# Patient Record
Sex: FEMALE | Race: OTHER | Employment: STUDENT | URBAN - METROPOLITAN AREA
[De-identification: names, ages, dates, MRNs, and addresses within clinical notes are randomized per-mention and may not be internally consistent; named-entity substitution may affect disease eponyms.]

---

## 2019-01-09 ENCOUNTER — HOSPITAL ENCOUNTER (EMERGENCY)
Facility: HOSPITAL | Age: 19
Discharge: HOME | End: 2019-01-09
Attending: EMERGENCY MEDICINE
Payer: MEDICAID

## 2019-01-09 VITALS
OXYGEN SATURATION: 99 % | RESPIRATION RATE: 18 BRPM | TEMPERATURE: 99 F | DIASTOLIC BLOOD PRESSURE: 70 MMHG | BODY MASS INDEX: 33.38 KG/M2 | WEIGHT: 170 LBS | HEART RATE: 72 BPM | SYSTOLIC BLOOD PRESSURE: 122 MMHG | HEIGHT: 60 IN

## 2019-01-09 DIAGNOSIS — H66.90 ACUTE OTITIS MEDIA, UNSPECIFIED OTITIS MEDIA TYPE: Primary | ICD-10-CM

## 2019-01-09 PROCEDURE — 99283 EMERGENCY DEPT VISIT LOW MDM: CPT

## 2019-01-09 RX ORDER — AMOXICILLIN 875 MG/1
875 TABLET, COATED ORAL 2 TIMES DAILY
Qty: 20 TABLET | Refills: 0 | Status: SHIPPED | OUTPATIENT
Start: 2019-01-09 | End: 2019-01-19

## 2019-01-09 NOTE — ED PROVIDER NOTES
Patient Seen in: [facility] AND [facility] Emergency Department    History   Patient presents with:  Ear Problem Pain (neurosensory)    Stated Complaint: left ear pain    HPI    62-year-old female with no significant past medical history presents to the ER for e deformity. Lymphadenopathy: No sig cervical LAD   Neurological: Awake, alert. Normal reflexes. No cranial nerve deficit. Skin: Skin is warm and dry. No rash noted. No erythema.    Psychiatric:    ED Course   Labs Reviewed - No data to display

## 2019-01-10 ENCOUNTER — HOSPITAL ENCOUNTER (EMERGENCY)
Facility: HOSPITAL | Age: 19
Discharge: HOME | End: 2019-01-10
Attending: EMERGENCY MEDICINE
Payer: MEDICAID

## 2019-01-10 VITALS
WEIGHT: 170 LBS | OXYGEN SATURATION: 99 % | HEART RATE: 94 BPM | TEMPERATURE: 98 F | DIASTOLIC BLOOD PRESSURE: 71 MMHG | BODY MASS INDEX: 33 KG/M2 | SYSTOLIC BLOOD PRESSURE: 129 MMHG | RESPIRATION RATE: 16 BRPM

## 2019-01-10 DIAGNOSIS — H60.502 ACUTE OTITIS EXTERNA OF LEFT EAR, UNSPECIFIED TYPE: Primary | ICD-10-CM

## 2019-01-10 PROCEDURE — 99283 EMERGENCY DEPT VISIT LOW MDM: CPT

## 2019-01-10 RX ORDER — ACETAMINOPHEN 500 MG
1000 TABLET ORAL ONCE
Status: COMPLETED | OUTPATIENT
Start: 2019-01-10 | End: 2019-01-10

## 2019-01-10 NOTE — ED NOTES
Pt provided with discharge instructions and prescription. Verbalized understanding for plan of care at home and follow up. All questions/concerns addressed prior to discharge.    Pt ambulatory out of er with steady gait

## 2019-01-10 NOTE — ED NOTES
Pt c/o left ear ache, states that she was seen yesterday and rxed an antibiotic. Pt also states that she has been taking motrin q 6 hr with no relief.

## 2019-01-10 NOTE — ED PROVIDER NOTES
Patient Seen in: [facility] AND [facility] Emergency Department    History   Patient presents with:  Ear Problem Pain (neurosensory)    Stated Complaint:     HPI    19-year-old female with no significant past medical history resents to the ER for evaluation of l Musculoskeletal: Normal range of motion. No deformity. Lymphadenopathy: No sig cervical LAD   Neurological: Awake, alert. Normal reflexes. No cranial nerve deficit. Skin: Skin is warm and dry. No rash noted. No erythema.    Psychiatric:    ED Course

## 2019-01-11 ENCOUNTER — HOSPITAL ENCOUNTER (EMERGENCY)
Facility: HOSPITAL | Age: 19
Discharge: HOME | End: 2019-01-11
Attending: PHYSICIAN ASSISTANT
Payer: MEDICAID

## 2019-01-11 ENCOUNTER — OFFICE VISIT (OUTPATIENT)
Dept: FAMILY MEDICINE CLINIC | Facility: CLINIC | Age: 19
End: 2019-01-11
Payer: MEDICAID

## 2019-01-11 VITALS
TEMPERATURE: 98 F | DIASTOLIC BLOOD PRESSURE: 76 MMHG | HEART RATE: 80 BPM | BODY MASS INDEX: 34.95 KG/M2 | HEIGHT: 60 IN | WEIGHT: 178 LBS | SYSTOLIC BLOOD PRESSURE: 108 MMHG | RESPIRATION RATE: 18 BRPM

## 2019-01-11 VITALS
RESPIRATION RATE: 18 BRPM | DIASTOLIC BLOOD PRESSURE: 62 MMHG | SYSTOLIC BLOOD PRESSURE: 105 MMHG | TEMPERATURE: 98 F | HEART RATE: 88 BPM | OXYGEN SATURATION: 98 %

## 2019-01-11 DIAGNOSIS — H60.502 ACUTE OTITIS EXTERNA OF LEFT EAR, UNSPECIFIED TYPE: Primary | ICD-10-CM

## 2019-01-11 DIAGNOSIS — H60.92 OTITIS EXTERNA OF LEFT EAR, UNSPECIFIED CHRONICITY, UNSPECIFIED TYPE: ICD-10-CM

## 2019-01-11 DIAGNOSIS — H65.00 ACUTE SEROUS OTITIS MEDIA, RECURRENCE NOT SPECIFIED, UNSPECIFIED LATERALITY: Primary | ICD-10-CM

## 2019-01-11 PROCEDURE — 99283 EMERGENCY DEPT VISIT LOW MDM: CPT

## 2019-01-11 PROCEDURE — 99212 OFFICE O/P EST SF 10 MIN: CPT | Performed by: FAMILY MEDICINE

## 2019-01-11 PROCEDURE — 99213 OFFICE O/P EST LOW 20 MIN: CPT | Performed by: FAMILY MEDICINE

## 2019-01-11 RX ORDER — TRAMADOL HYDROCHLORIDE 50 MG/1
50 TABLET ORAL EVERY 6 HOURS PRN
Qty: 12 TABLET | Refills: 0 | Status: SHIPPED | OUTPATIENT
Start: 2019-01-11 | End: 2019-01-17

## 2019-01-11 RX ORDER — OFLOXACIN 3 MG/ML
10 SOLUTION AURICULAR (OTIC) 2 TIMES DAILY
Qty: 1 BOTTLE | Refills: 0 | Status: SHIPPED | OUTPATIENT
Start: 2019-01-11 | End: 2020-11-02 | Stop reason: ALTCHOICE

## 2019-01-11 RX ORDER — TRAMADOL HYDROCHLORIDE 50 MG/1
50 TABLET ORAL ONCE
Status: COMPLETED | OUTPATIENT
Start: 2019-01-11 | End: 2019-01-11

## 2019-01-11 RX ORDER — ACETAMINOPHEN 500 MG
500 TABLET ORAL EVERY 6 HOURS PRN
COMMUNITY
End: 2020-11-02 | Stop reason: ALTCHOICE

## 2019-01-11 RX ORDER — AMOXICILLIN AND CLAVULANATE POTASSIUM 875; 125 MG/1; MG/1
1 TABLET, FILM COATED ORAL 2 TIMES DAILY
Qty: 20 TABLET | Refills: 0 | Status: SHIPPED | OUTPATIENT
Start: 2019-01-11 | End: 2019-01-21

## 2019-01-11 RX ORDER — AMOXICILLIN 875 MG/1
TABLET, COATED ORAL
Refills: 0 | COMMUNITY
Start: 2019-01-09 | End: 2019-01-11

## 2019-01-11 RX ORDER — OFLOXACIN 3 MG/ML
10 SOLUTION AURICULAR (OTIC) 2 TIMES DAILY
Qty: 1 BOTTLE | Refills: 0 | Status: SHIPPED | OUTPATIENT
Start: 2019-01-11 | End: 2019-01-11

## 2019-01-11 NOTE — PROGRESS NOTES
Blood pressure 108/76, pulse 80, temperature 97.9 °F (36.6 °C), temperature source Oral, resp. rate 18, height 5' (1.524 m), weight 178 lb (80.7 kg). Presents today complaining of left ear pain that began 3 days ago.   Denies nasal congestion or swimming

## 2019-01-12 ENCOUNTER — HOSPITAL ENCOUNTER (EMERGENCY)
Facility: HOSPITAL | Age: 19
Discharge: HOME | End: 2019-01-12
Attending: EMERGENCY MEDICINE
Payer: MEDICAID

## 2019-01-12 VITALS
HEART RATE: 91 BPM | OXYGEN SATURATION: 98 % | TEMPERATURE: 98 F | SYSTOLIC BLOOD PRESSURE: 109 MMHG | BODY MASS INDEX: 33.38 KG/M2 | RESPIRATION RATE: 18 BRPM | WEIGHT: 170 LBS | HEIGHT: 60 IN | DIASTOLIC BLOOD PRESSURE: 66 MMHG

## 2019-01-12 DIAGNOSIS — H60.502 ACUTE OTITIS EXTERNA OF LEFT EAR, UNSPECIFIED TYPE: Primary | ICD-10-CM

## 2019-01-12 LAB — B-HCG UR QL: NEGATIVE

## 2019-01-12 PROCEDURE — 81025 URINE PREGNANCY TEST: CPT

## 2019-01-12 PROCEDURE — 96372 THER/PROPH/DIAG INJ SC/IM: CPT

## 2019-01-12 PROCEDURE — 99283 EMERGENCY DEPT VISIT LOW MDM: CPT

## 2019-01-12 RX ORDER — IBUPROFEN 600 MG/1
600 TABLET ORAL ONCE
Status: COMPLETED | OUTPATIENT
Start: 2019-01-12 | End: 2019-01-12

## 2019-01-12 RX ORDER — MORPHINE SULFATE 4 MG/ML
4 INJECTION, SOLUTION INTRAMUSCULAR; INTRAVENOUS ONCE
Status: COMPLETED | OUTPATIENT
Start: 2019-01-12 | End: 2019-01-12

## 2019-01-12 NOTE — ED NOTES
Rec'd patient sitting on cart with family members with complaints continued left ear pain. States ear pain started on Tuesday and was seen and prescribed abx and patient states she returned the following day because she continued to have pain.   Mom states

## 2019-01-12 NOTE — ED INITIAL ASSESSMENT (HPI)
Pt dx with ear infection 1/9/19 at [address] [place] [address] ED. Pt was started on abx on the 9th. Pt returned yesterday for continued pain and dx additionally with otitis externa and started on ear drops. Pt here  Today with c/o increased pain despite motrin and tylenol.

## 2019-01-12 NOTE — ED INITIAL ASSESSMENT (HPI)
Left earrache since Tuesday, started antibiotics Wednesday, has had worsening symptoms, seen yesterday for same, given tramadol with no relief last dose at 0100.

## 2019-01-12 NOTE — ED PROVIDER NOTES
Patient Seen in: [facility] AND [facility] Emergency Department    History   Patient presents with:  Ear Problem Pain (neurosensory)    Stated Complaint: seen here yesterday for an ear infection, medicine is not working    HPI      69-year-old female presents wi No      Frequency: Never    Drug use: Not on file      Review of Systems    Positive for stated complaint: seen here yesterday for an ear infection, medicine is not working  Other systems are as noted in HPI. Constitutional and vital signs reviewed. organomegaly is noted. No peritoneal signs. Genitourinary: Not examined. Lymphatic: No gross lymphadenopathy noted. Musculoskeletal: Musculoskeletal system is grossly intact. There is no obvious deformity.   Neurological: Gross motor movement is intact

## 2019-01-13 NOTE — ED PROVIDER NOTES
Patient Seen in: [facility] AND [facility] Emergency Department    History   Patient presents with:  Ear Pain    Stated Complaint: left ear pain    HPI    23 yo female seen earlier in the day and diagnosed with left otitis externa.  Taking tramadol at home but pa PREGNANCY URINE - Normal                MDM   Wick placed in left ear. Patient given pain meds and feeling better. Discharged home.              Disposition and Plan     Clinical Impression:  Acute otitis externa of left ear, unspecified type  (primary enco

## 2019-01-14 ENCOUNTER — HOSPITAL ENCOUNTER (EMERGENCY)
Facility: HOSPITAL | Age: 19
Discharge: HOME | End: 2019-01-14
Attending: EMERGENCY MEDICINE
Payer: MEDICAID

## 2019-01-14 VITALS
DIASTOLIC BLOOD PRESSURE: 73 MMHG | WEIGHT: 170 LBS | RESPIRATION RATE: 20 BRPM | SYSTOLIC BLOOD PRESSURE: 108 MMHG | OXYGEN SATURATION: 98 % | BODY MASS INDEX: 33.38 KG/M2 | HEART RATE: 103 BPM | HEIGHT: 60 IN | TEMPERATURE: 100 F

## 2019-01-14 DIAGNOSIS — J02.0 ACUTE STREPTOCOCCAL PHARYNGITIS: ICD-10-CM

## 2019-01-14 DIAGNOSIS — H60.501 ACUTE OTITIS EXTERNA OF RIGHT EAR, UNSPECIFIED TYPE: Primary | ICD-10-CM

## 2019-01-14 LAB
ANION GAP SERPL CALC-SCNC: 11 MMOL/L (ref 0–18)
B-HCG UR QL: NEGATIVE
BASOPHILS # BLD: 0 K/UL (ref 0–0.2)
BASOPHILS NFR BLD: 0 %
BILIRUB UR QL: NEGATIVE
BUN SERPL-MCNC: 5 MG/DL (ref 8–20)
BUN/CREAT SERPL: 8.2 (ref 10–20)
CALCIUM SERPL-MCNC: 8.8 MG/DL (ref 8.5–10.5)
CHLORIDE SERPL-SCNC: 104 MMOL/L (ref 95–110)
CLARITY UR: CLEAR
CO2 SERPL-SCNC: 22 MMOL/L (ref 22–32)
COLOR UR: YELLOW
CREAT SERPL-MCNC: 0.61 MG/DL (ref 0.5–1.5)
EOSINOPHIL # BLD: 0.1 K/UL (ref 0–0.7)
EOSINOPHIL NFR BLD: 1 %
ERYTHROCYTE [DISTWIDTH] IN BLOOD BY AUTOMATED COUNT: 14.7 % (ref 11–15)
GLUCOSE SERPL-MCNC: 97 MG/DL (ref 70–99)
GLUCOSE UR-MCNC: NEGATIVE MG/DL
HCT VFR BLD AUTO: 35.5 % (ref 35–48)
HETEROPH AB SER QL: NEGATIVE
HGB BLD-MCNC: 11.4 G/DL (ref 12–16)
HGB UR QL STRIP.AUTO: NEGATIVE
KETONES UR-MCNC: NEGATIVE MG/DL
LEUKOCYTE ESTERASE UR QL STRIP.AUTO: NEGATIVE
LYMPHOCYTES # BLD: 1.9 K/UL (ref 1–4)
LYMPHOCYTES NFR BLD: 16 %
MCH RBC QN AUTO: 25.5 PG (ref 27–32)
MCHC RBC AUTO-ENTMCNC: 32.2 G/DL (ref 32–37)
MCV RBC AUTO: 79.1 FL (ref 80–100)
MONOCYTES # BLD: 0.8 K/UL (ref 0–1)
MONOCYTES NFR BLD: 7 %
NEUTROPHILS # BLD AUTO: 8.6 K/UL (ref 1.8–7.7)
NEUTROPHILS NFR BLD: 76 %
NITRITE UR QL STRIP.AUTO: NEGATIVE
OSMOLALITY UR CALC.SUM OF ELEC: 281 MOSM/KG (ref 275–295)
PH UR: 5 [PH] (ref 5–8)
PLATELET # BLD AUTO: 214 K/UL (ref 140–400)
PMV BLD AUTO: 10.5 FL (ref 7.4–10.3)
POTASSIUM SERPL-SCNC: 4.1 MMOL/L (ref 3.3–5.1)
PROT UR-MCNC: NEGATIVE MG/DL
RBC # BLD AUTO: 4.49 M/UL (ref 3.7–5.4)
RBC #/AREA URNS AUTO: 1 /HPF
S PYO AG THROAT QL: POSITIVE
SODIUM SERPL-SCNC: 137 MMOL/L (ref 136–144)
SP GR UR STRIP: 1.03 (ref 1–1.03)
UROBILINOGEN UR STRIP-ACNC: <2
VIT C UR-MCNC: NEGATIVE MG/DL
WBC # BLD AUTO: 11.4 K/UL (ref 4–11)
WBC #/AREA URNS AUTO: 2 /HPF

## 2019-01-14 PROCEDURE — 81025 URINE PREGNANCY TEST: CPT

## 2019-01-14 PROCEDURE — 99283 EMERGENCY DEPT VISIT LOW MDM: CPT

## 2019-01-14 PROCEDURE — 36415 COLL VENOUS BLD VENIPUNCTURE: CPT

## 2019-01-14 PROCEDURE — 85025 COMPLETE CBC W/AUTO DIFF WBC: CPT | Performed by: EMERGENCY MEDICINE

## 2019-01-14 PROCEDURE — 86308 HETEROPHILE ANTIBODY SCREEN: CPT | Performed by: EMERGENCY MEDICINE

## 2019-01-14 PROCEDURE — 87430 STREP A AG IA: CPT

## 2019-01-14 PROCEDURE — 81003 URINALYSIS AUTO W/O SCOPE: CPT | Performed by: EMERGENCY MEDICINE

## 2019-01-14 PROCEDURE — 80048 BASIC METABOLIC PNL TOTAL CA: CPT | Performed by: EMERGENCY MEDICINE

## 2019-01-14 RX ORDER — OFLOXACIN 3 MG/ML
SOLUTION AURICULAR (OTIC) DAILY
Qty: 1 BOTTLE | Refills: 0 | Status: SHIPPED | OUTPATIENT
Start: 2019-01-14 | End: 2019-01-21

## 2019-01-14 RX ORDER — IBUPROFEN 600 MG/1
600 TABLET ORAL ONCE
Status: COMPLETED | OUTPATIENT
Start: 2019-01-14 | End: 2019-01-14

## 2019-01-14 NOTE — ED INITIAL ASSESSMENT (HPI)
Pt c/o bilateral ear pain started 4 days ago, sts that she has been seen in this ER 4x already since her symptom started, was prescribed with oral antibiotic with a little improvement to left ear, pt sts that it started on left ear and now having the same

## 2019-01-14 NOTE — ED NOTES
I accidentally entered positive ucg result on this pt. I did not run a ucg on the patient.  shaan is aware and serafin feliz was emailed to fix the chart

## 2019-01-14 NOTE — ED NOTES
I incorrectly entered a positive UCG on this patient into the accucheck machine. I did not run a UCG on this pt.  Sanket Jay has

## 2019-01-14 NOTE — ED NOTES
Presents with bilat ear pain for 4 days, new onset to right ear radiating to right jaw, also throat pain. Pt has been seen daily in ED for last 4 days, on oral ABX with some relief to left ear pain.

## 2019-01-14 NOTE — ED PROVIDER NOTES
Patient Seen in: [facility] Emergency Department    History   Patient presents with:  Ear Problem Pain (neurosensory): Bilateral  Sore Throat    Stated Complaint: Ear Problem    HPI    59-year-old female presents for complaint of bilateral ear ach 98%   BMI 33.20 kg/m²         Physical Exam   Constitutional: She is oriented to person, place, and time. She appears well-developed and well-nourished. HENT:   Head: Normocephalic and atraumatic.    Right Ear: Tympanic membrane normal. There is swelling (*)     MCH 25.5 (*)     MPV 10.5 (*)     Neutrophil Absolute 8.6 (*)     All other components within normal limits   MONONUCLEOSIS, QUAL - Normal   EMH POCT PREGNANCY URINE - Normal   CBC WITH DIFFERENTIAL WITH PLATELET    Narrative:      The following ord [id number]  [phone]    Schedule an appointment as soon as possible for a visit in 2 days  For follow up and re-evaluation        Medications Prescribed:  Discharge Medication List as of 1/14/2019 10:18 AM    START taking these medications    !! ofloxacin 0

## 2019-01-17 ENCOUNTER — OFFICE VISIT (OUTPATIENT)
Dept: FAMILY MEDICINE CLINIC | Facility: CLINIC | Age: 19
End: 2019-01-17
Payer: MEDICAID

## 2019-01-17 VITALS
TEMPERATURE: 98 F | WEIGHT: 178 LBS | BODY MASS INDEX: 34.95 KG/M2 | SYSTOLIC BLOOD PRESSURE: 114 MMHG | HEART RATE: 76 BPM | HEIGHT: 60 IN | DIASTOLIC BLOOD PRESSURE: 74 MMHG

## 2019-01-17 DIAGNOSIS — H60.91 OTITIS EXTERNA OF RIGHT EAR, UNSPECIFIED CHRONICITY, UNSPECIFIED TYPE: Primary | ICD-10-CM

## 2019-01-17 PROCEDURE — 99212 OFFICE O/P EST SF 10 MIN: CPT | Performed by: FAMILY MEDICINE

## 2019-01-17 PROCEDURE — 99213 OFFICE O/P EST LOW 20 MIN: CPT | Performed by: FAMILY MEDICINE

## 2019-01-17 RX ORDER — NEOMYCIN SULFATE, POLYMYXIN B SULFATE AND HYDROCORTISONE 10; 3.5; 1 MG/ML; MG/ML; [USP'U]/ML
SUSPENSION/ DROPS AURICULAR (OTIC)
Refills: 0 | COMMUNITY
Start: 2019-01-10 | End: 2020-11-02 | Stop reason: ALTCHOICE

## 2019-01-17 NOTE — PROGRESS NOTES
Blood pressure 114/74, pulse 76, temperature 98.4 °F (36.9 °C), temperature source Oral, height 5' (1.524 m), weight 178 lb (80.7 kg), last menstrual period 12/23/2018. Patient presents today complaining of 6 days of right ear pain.   Her left ear had be

## 2019-01-24 ENCOUNTER — OFFICE VISIT (OUTPATIENT)
Dept: OTOLARYNGOLOGY | Facility: CLINIC | Age: 19
End: 2019-01-24
Payer: MEDICAID

## 2019-01-24 VITALS
HEIGHT: 61 IN | DIASTOLIC BLOOD PRESSURE: 60 MMHG | SYSTOLIC BLOOD PRESSURE: 103 MMHG | TEMPERATURE: 98 F | WEIGHT: 170 LBS | BODY MASS INDEX: 32.1 KG/M2

## 2019-01-24 DIAGNOSIS — H66.90 EAR INFECTION: Primary | ICD-10-CM

## 2019-01-24 PROCEDURE — 99203 OFFICE O/P NEW LOW 30 MIN: CPT | Performed by: OTOLARYNGOLOGY

## 2019-01-24 PROCEDURE — 99212 OFFICE O/P EST SF 10 MIN: CPT | Performed by: OTOLARYNGOLOGY

## 2019-01-24 NOTE — PROGRESS NOTES
Francisco Berrios is a 22year old female. Patient presents with:  Ear Problem: recurring bilateral ear infection      HISTORY OF PRESENT ILLNESS    She presents with a history of 1 bad ear infection that occurred bilaterally in the past 2 weeks.   Seen by person & situation. Appropriate mood and affect.    Neck Exam Normal Inspection - Normal. Palpation - Normal. Parotid gland - Normal. Thyroid gland - Normal.   Eyes Normal Conjunctiva - Right: Normal, Left: Normal. Pupil - Right: Normal, Left: Normal. Fundu

## 2019-05-24 ENCOUNTER — TELEPHONE (OUTPATIENT)
Dept: FAMILY MEDICINE CLINIC | Facility: CLINIC | Age: 19
End: 2019-05-24

## 2019-05-24 NOTE — TELEPHONE ENCOUNTER
Angie Suárez from [facility] called said he was trying to reach Pt due the frequent ER Visits    He stated wants to speak with a nurse to see if Pt made any follow up visits    Please advise.   He stated he is in office   8 am - 4:30 pm Mon-Friday if no answer can leave message on v/m

## 2019-05-24 NOTE — TELEPHONE ENCOUNTER
Spoke to [employer] and he just wanted to know if patient had follow up from ER and if she had any coming up appointments.

## 2019-08-15 ENCOUNTER — OFFICE VISIT (OUTPATIENT)
Dept: FAMILY MEDICINE CLINIC | Facility: CLINIC | Age: 19
End: 2019-08-15
Payer: MEDICAID

## 2019-08-15 VITALS
TEMPERATURE: 99 F | BODY MASS INDEX: 27.56 KG/M2 | SYSTOLIC BLOOD PRESSURE: 116 MMHG | HEIGHT: 61 IN | HEART RATE: 101 BPM | DIASTOLIC BLOOD PRESSURE: 79 MMHG | WEIGHT: 146 LBS

## 2019-08-15 DIAGNOSIS — L71.9 ACNE ROSACEA: Primary | ICD-10-CM

## 2019-08-15 PROCEDURE — 99213 OFFICE O/P EST LOW 20 MIN: CPT | Performed by: FAMILY MEDICINE

## 2019-08-15 RX ORDER — METRONIDAZOLE 10 MG/G
1 GEL TOPICAL 2 TIMES DAILY
Qty: 1 EACH | Refills: 2 | Status: SHIPPED | OUTPATIENT
Start: 2019-08-15 | End: 2019-08-16

## 2019-08-15 NOTE — PROGRESS NOTES
Blood pressure 116/79, pulse 101, temperature 98.7 °F (37.1 °C), temperature source Oral, height 5' 1\" (1.549 m), weight 146 lb (66.2 kg), last menstrual period 07/25/2019.     Patient presents today following up for rashes that have been occurring for the

## 2019-11-13 ENCOUNTER — HOSPITAL ENCOUNTER (OUTPATIENT)
Age: 19
Discharge: HOME | End: 2019-11-13
Attending: EMERGENCY MEDICINE
Payer: MEDICAID

## 2019-11-13 VITALS
OXYGEN SATURATION: 100 % | TEMPERATURE: 98 F | RESPIRATION RATE: 18 BRPM | WEIGHT: 131 LBS | HEIGHT: 60 IN | BODY MASS INDEX: 25.72 KG/M2 | SYSTOLIC BLOOD PRESSURE: 110 MMHG | HEART RATE: 77 BPM | DIASTOLIC BLOOD PRESSURE: 70 MMHG

## 2019-11-13 DIAGNOSIS — N30.00 ACUTE CYSTITIS WITHOUT HEMATURIA: Primary | ICD-10-CM

## 2019-11-13 PROCEDURE — 87186 SC STD MICRODIL/AGAR DIL: CPT | Performed by: EMERGENCY MEDICINE

## 2019-11-13 PROCEDURE — 81025 URINE PREGNANCY TEST: CPT

## 2019-11-13 PROCEDURE — 87077 CULTURE AEROBIC IDENTIFY: CPT | Performed by: EMERGENCY MEDICINE

## 2019-11-13 PROCEDURE — 99214 OFFICE O/P EST MOD 30 MIN: CPT

## 2019-11-13 PROCEDURE — 87086 URINE CULTURE/COLONY COUNT: CPT | Performed by: EMERGENCY MEDICINE

## 2019-11-13 RX ORDER — CEPHALEXIN 500 MG/1
500 CAPSULE ORAL 3 TIMES DAILY
Qty: 15 CAPSULE | Refills: 0 | Status: SHIPPED | OUTPATIENT
Start: 2019-11-13 | End: 2019-11-18

## 2019-11-13 NOTE — ED PROVIDER NOTES
Patient Seen in: [facility] Immediate Care In [address] [place] [address]      History   Patient presents with:  Urinary Symptoms (urologic)    Stated Complaint: uti    HPI    The patient is a 30-year-old female with no significant past medical history presents now bilaterally  Extremities: No focal swelling or tenderness  Skin: No pallor, no redness or warmth to the touch      ED Course     Labs Reviewed   EMH POCT PREGNANCY URINE - Normal   URINE CULTURE, ROUTINE          Due to having previously taken Azo, the pat

## 2020-03-05 ENCOUNTER — OFFICE VISIT (OUTPATIENT)
Dept: FAMILY MEDICINE CLINIC | Facility: CLINIC | Age: 20
End: 2020-03-05
Payer: MEDICAID

## 2020-03-05 VITALS
SYSTOLIC BLOOD PRESSURE: 110 MMHG | HEIGHT: 61 IN | HEART RATE: 75 BPM | WEIGHT: 118 LBS | DIASTOLIC BLOOD PRESSURE: 79 MMHG | TEMPERATURE: 98 F | BODY MASS INDEX: 22.28 KG/M2

## 2020-03-05 DIAGNOSIS — L71.9 ACNE ROSACEA: Primary | ICD-10-CM

## 2020-03-05 PROCEDURE — 99213 OFFICE O/P EST LOW 20 MIN: CPT | Performed by: FAMILY MEDICINE

## 2020-03-05 RX ORDER — METRONIDAZOLE 10 MG/G
GEL TOPICAL DAILY
COMMUNITY
End: 2020-12-22 | Stop reason: ALTCHOICE

## 2020-03-05 RX ORDER — DOXYCYCLINE HYCLATE 100 MG
100 TABLET ORAL 2 TIMES DAILY
Qty: 60 TABLET | Refills: 2 | Status: SHIPPED | OUTPATIENT
Start: 2020-03-05 | End: 2020-11-02

## 2020-03-05 NOTE — PROGRESS NOTES
Blood pressure 110/79, pulse 75, temperature 97.7 °F (36.5 °C), height 5' 1\" (1.549 m), weight 118 lb (53.5 kg), last menstrual period 02/14/2020. Patient presents today following up for persistent rash on the cheeks. She reports she feels well.   No i

## 2020-03-06 ENCOUNTER — TELEPHONE (OUTPATIENT)
Dept: FAMILY MEDICINE CLINIC | Facility: CLINIC | Age: 20
End: 2020-03-06

## 2020-03-06 NOTE — TELEPHONE ENCOUNTER
Prior authorization for Azelex 20% cream completed w/ [employer]  on cover my meds Key: [id number], turn around time 3-5 days.

## 2020-03-09 NOTE — TELEPHONE ENCOUNTER
Message left on pharmacy voicemail indicating Azelex has been approved. Granted date 02/01/2020-03/07/2021.

## 2020-11-02 ENCOUNTER — NURSE TRIAGE (OUTPATIENT)
Dept: FAMILY MEDICINE CLINIC | Facility: CLINIC | Age: 20
End: 2020-11-02

## 2020-11-02 ENCOUNTER — OFFICE VISIT (OUTPATIENT)
Dept: FAMILY MEDICINE CLINIC | Facility: CLINIC | Age: 20
End: 2020-11-02
Payer: MEDICAID

## 2020-11-02 VITALS
BODY MASS INDEX: 20.78 KG/M2 | HEART RATE: 78 BPM | SYSTOLIC BLOOD PRESSURE: 106 MMHG | WEIGHT: 110.06 LBS | DIASTOLIC BLOOD PRESSURE: 68 MMHG | HEIGHT: 61 IN | RESPIRATION RATE: 16 BRPM

## 2020-11-02 DIAGNOSIS — R55 VASOVAGAL SYNCOPE: Primary | ICD-10-CM

## 2020-11-02 PROCEDURE — 3074F SYST BP LT 130 MM HG: CPT | Performed by: FAMILY MEDICINE

## 2020-11-02 PROCEDURE — 3008F BODY MASS INDEX DOCD: CPT | Performed by: FAMILY MEDICINE

## 2020-11-02 PROCEDURE — 3078F DIAST BP <80 MM HG: CPT | Performed by: FAMILY MEDICINE

## 2020-11-02 PROCEDURE — 99213 OFFICE O/P EST LOW 20 MIN: CPT | Performed by: FAMILY MEDICINE

## 2020-11-02 NOTE — TELEPHONE ENCOUNTER
Action Requested: Summary for Provider     []  Critical Lab, Recommendations Needed  [] Need Additional Advice  []   FYI    []   Need Orders  [] Need Medications Sent to Pharmacy  []  Other     SUMMARY: patient self scheduled appt for today.  Per patient sh

## 2020-11-02 NOTE — PROGRESS NOTES
Blood pressure 106/68, pulse 78, resp. rate 16, height 5' 1\" (1.549 m), weight 110 lb 1 oz (49.9 kg), last menstrual period 10/28/2020. Presents today complaining of feeling lightheaded and having headaches.   She reports she fainted after getting off t

## 2020-12-22 ENCOUNTER — OFFICE VISIT (OUTPATIENT)
Dept: FAMILY MEDICINE CLINIC | Facility: CLINIC | Age: 20
End: 2020-12-22
Payer: MEDICAID

## 2020-12-22 VITALS
WEIGHT: 110 LBS | HEART RATE: 82 BPM | BODY MASS INDEX: 20.77 KG/M2 | DIASTOLIC BLOOD PRESSURE: 58 MMHG | HEIGHT: 61 IN | SYSTOLIC BLOOD PRESSURE: 96 MMHG

## 2020-12-22 DIAGNOSIS — N89.8 VAGINAL DISCHARGE: ICD-10-CM

## 2020-12-22 DIAGNOSIS — Z30.011 ORAL CONTRACEPTION INITIAL PRESCRIPTION: ICD-10-CM

## 2020-12-22 DIAGNOSIS — Z01.419 ENCOUNTER FOR ROUTINE GYNECOLOGICAL EXAMINATION WITH PAPANICOLAOU SMEAR OF CERVIX: ICD-10-CM

## 2020-12-22 DIAGNOSIS — Z00.00 ROUTINE GENERAL MEDICAL EXAMINATION AT A HEALTH CARE FACILITY: Primary | ICD-10-CM

## 2020-12-22 PROCEDURE — 3074F SYST BP LT 130 MM HG: CPT | Performed by: PHYSICIAN ASSISTANT

## 2020-12-22 PROCEDURE — 3008F BODY MASS INDEX DOCD: CPT | Performed by: PHYSICIAN ASSISTANT

## 2020-12-22 PROCEDURE — 99395 PREV VISIT EST AGE 18-39: CPT | Performed by: PHYSICIAN ASSISTANT

## 2020-12-22 PROCEDURE — 99213 OFFICE O/P EST LOW 20 MIN: CPT | Performed by: PHYSICIAN ASSISTANT

## 2020-12-22 PROCEDURE — 90471 IMMUNIZATION ADMIN: CPT | Performed by: PHYSICIAN ASSISTANT

## 2020-12-22 PROCEDURE — 90715 TDAP VACCINE 7 YRS/> IM: CPT | Performed by: PHYSICIAN ASSISTANT

## 2020-12-22 PROCEDURE — 3078F DIAST BP <80 MM HG: CPT | Performed by: PHYSICIAN ASSISTANT

## 2020-12-22 PROCEDURE — 81025 URINE PREGNANCY TEST: CPT | Performed by: PHYSICIAN ASSISTANT

## 2020-12-22 PROCEDURE — 90651 9VHPV VACCINE 2/3 DOSE IM: CPT | Performed by: PHYSICIAN ASSISTANT

## 2020-12-22 PROCEDURE — 90746 HEPB VACCINE 3 DOSE ADULT IM: CPT | Performed by: PHYSICIAN ASSISTANT

## 2020-12-22 PROCEDURE — 90472 IMMUNIZATION ADMIN EACH ADD: CPT | Performed by: PHYSICIAN ASSISTANT

## 2020-12-22 RX ORDER — NORETHINDRONE ACETATE AND ETHINYL ESTRADIOL 1; .02 MG/1; MG/1
1 TABLET ORAL DAILY
Qty: 1 PACKAGE | Refills: 11 | Status: SHIPPED | OUTPATIENT
Start: 2020-12-22 | End: 2022-01-06

## 2020-12-23 NOTE — PROGRESS NOTES
HPI:   Sammi Hodgkins is a 23year old female who presents for an Annual Health Visit. Patient requests BCP and complaints of intermittent vaginal discharge.    Patient denies of chest pain, SOB, N/V/C/D, fever, dizziness, syncope, abdominal pain, v 20.78 kg/m². Physical Exam   Constitutional: She is oriented to person, place, and time. She appears well-developed and well-nourished. HENT:   Head: Normocephalic and atraumatic.    Right Ear: External ear normal.   Left Ear: External ear normal.   No GENITAL VAGINOSIS SCREEN; Future  -     THINPREP PAP WITH HPV REFLEX REQUEST B; Future  -     CHLAMYDIA/GONOCOCCUS, HAI;  Future  -     URINE PREGNANCY TEST  -     CHLAMYDIA/GONOCOCCUS, HAI  -     THINPREP PAP WITH HPV REFLEX REQUEST B  -     GENITAL VAGINO every 3 years1    Cervical cancer Women ages 24 and older  Women between ages 24 and 34 should have a Pap test every 3 years; women between ages 27 and 72 are advised to have a Pap test plus an HPV test every 5 years    Chlamydia Sexually active women ages their healthcare provider  1 to 3 doses   Human papillomavirus (HPV)  All women in this age group up to age 32  3 doses; the second dose should be given 1 to 2 months after the first dose and the third dose given 6 months after the first dose    Influenza part of their routine health exam every 3 years. Breast self-exams are an option for women starting in their 25s.  But the U.S. Preventive Services Task Force (USPSTF) does not recommend CBE.    2 Those who are 25years old and not up-to-date on their childh

## 2020-12-28 ENCOUNTER — HOSPITAL ENCOUNTER (OUTPATIENT)
Age: 20
Discharge: HOME | End: 2020-12-28
Payer: MEDICAID

## 2020-12-28 ENCOUNTER — E-VISIT (OUTPATIENT)
Dept: TELEHEALTH | Age: 20
End: 2020-12-28

## 2020-12-28 VITALS
DIASTOLIC BLOOD PRESSURE: 64 MMHG | RESPIRATION RATE: 20 BRPM | HEIGHT: 61 IN | HEART RATE: 75 BPM | SYSTOLIC BLOOD PRESSURE: 117 MMHG | WEIGHT: 110 LBS | TEMPERATURE: 97 F | BODY MASS INDEX: 20.77 KG/M2 | OXYGEN SATURATION: 99 %

## 2020-12-28 DIAGNOSIS — B37.3 CANDIDIASIS OF VAGINA: ICD-10-CM

## 2020-12-28 DIAGNOSIS — Z02.9 ADMINISTRATIVE ENCOUNTER: Primary | ICD-10-CM

## 2020-12-28 DIAGNOSIS — N89.8 VAGINAL DISCHARGE: ICD-10-CM

## 2020-12-28 DIAGNOSIS — N89.8 VAGINAL DISCHARGE: Primary | ICD-10-CM

## 2020-12-28 LAB
BILIRUB UR QL STRIP: NEGATIVE
COLOR UR: YELLOW
GLUCOSE UR STRIP-MCNC: NEGATIVE MG/DL
HGB UR QL STRIP: NEGATIVE
KETONES UR STRIP-MCNC: NEGATIVE MG/DL
NITRITE UR QL STRIP: NEGATIVE
PH UR STRIP: 6 [PH]
PROT UR STRIP-MCNC: NEGATIVE MG/DL
SP GR UR STRIP: 1.02
UROBILINOGEN UR STRIP-ACNC: <2 MG/DL

## 2020-12-28 PROCEDURE — 81002 URINALYSIS NONAUTO W/O SCOPE: CPT | Performed by: EMERGENCY MEDICINE

## 2020-12-28 PROCEDURE — 99203 OFFICE O/P NEW LOW 30 MIN: CPT | Performed by: EMERGENCY MEDICINE

## 2020-12-28 RX ORDER — FLUCONAZOLE 150 MG/1
TABLET ORAL
Qty: 2 TABLET | Refills: 0 | Status: SHIPPED | OUTPATIENT
Start: 2020-12-28 | End: 2021-05-21 | Stop reason: ALTCHOICE

## 2020-12-28 NOTE — PROGRESS NOTES
Breanna Lozada is a 23year old female. HPI:   See answers to questions above. Current Outpatient Medications   Medication Sig Dispense Refill   • metRONIDAZOLE 500 MG Oral Tab Take 1 tablet (500 mg total) by mouth 2 (two) times daily for 7 days.  15

## 2020-12-29 NOTE — ED PROVIDER NOTES
Patient Seen in: [facility]      History   Patient presents with:  Julisa    Stated Complaint: vag discomfort    Lolly Chase is a 23year old female here for vaginal itching, and mild discomfort.   Patient states that she is on the end of round, and reactive to light. Pulmonary:      Effort: No respiratory distress. Abdominal:      General: Abdomen is flat. Palpations: Abdomen is soft. Tenderness: There is no right CVA tenderness, left CVA tenderness or guarding.       Hernia: findings, and results with patient. She agrees with plan of care. Medical Record Review: I reviewed available prior medical records for any recent pertinent discharge summaries, testing, and procedures.      I have given the patient instructions regardi

## 2020-12-29 NOTE — ED INITIAL ASSESSMENT (HPI)
Pt was dx with bv on 10/22/20. Pt has 2 days left of flagyl. Pt states she had an evisit today and was told to come here for an evaluation. Pt states she has more vaginal discharge and vaginal itching.

## 2020-12-30 LAB — TRICHOMONAS SCREEN: NEGATIVE

## 2021-01-04 ENCOUNTER — OFFICE VISIT (OUTPATIENT)
Dept: FAMILY MEDICINE CLINIC | Facility: CLINIC | Age: 21
End: 2021-01-04
Payer: MEDICAID

## 2021-01-04 VITALS
DIASTOLIC BLOOD PRESSURE: 63 MMHG | BODY MASS INDEX: 20.44 KG/M2 | WEIGHT: 108.25 LBS | HEIGHT: 61 IN | HEART RATE: 76 BPM | SYSTOLIC BLOOD PRESSURE: 99 MMHG

## 2021-01-04 DIAGNOSIS — B37.3 CANDIDIASIS OF VAGINA: Primary | ICD-10-CM

## 2021-01-04 DIAGNOSIS — N89.8 VAGINAL DISCHARGE: ICD-10-CM

## 2021-01-04 PROCEDURE — 3074F SYST BP LT 130 MM HG: CPT | Performed by: PHYSICIAN ASSISTANT

## 2021-01-04 PROCEDURE — 99213 OFFICE O/P EST LOW 20 MIN: CPT | Performed by: PHYSICIAN ASSISTANT

## 2021-01-04 PROCEDURE — 3078F DIAST BP <80 MM HG: CPT | Performed by: PHYSICIAN ASSISTANT

## 2021-01-04 PROCEDURE — 3008F BODY MASS INDEX DOCD: CPT | Performed by: PHYSICIAN ASSISTANT

## 2021-01-04 RX ORDER — FLUCONAZOLE 150 MG/1
TABLET ORAL
Qty: 3 TABLET | Refills: 0 | Status: SHIPPED | OUTPATIENT
Start: 2021-01-04 | End: 2021-05-21 | Stop reason: ALTCHOICE

## 2021-01-04 NOTE — PROGRESS NOTES
HPI:     Vaginal Discharge  The patient's primary symptoms include vaginal discharge. The patient's pertinent negatives include no genital itching, genital lesions, genital odor, pelvic pain or vaginal bleeding. This is a recurrent problem.  The current epi History      Marital status: Single      Spouse name: Not on file      Number of children: Not on file      Years of education: Not on file      Highest education level: Not on file    Occupational History      Not on file    Social Needs      Financial re distention. Genitourinary: Positive for vaginal discharge. Negative for dysuria, frequency, hematuria, flank pain and pelvic pain. Musculoskeletal: Negative for back pain. Skin: Negative for rash.    Neurological: Negative for dizziness, weakness, num questions or concerns.

## 2021-01-07 LAB
GENITAL VAGINOSIS SCREEN: NEGATIVE
TRICHOMONAS SCREEN: NEGATIVE

## 2021-02-18 ENCOUNTER — TELEPHONE (OUTPATIENT)
Dept: FAMILY MEDICINE CLINIC | Facility: CLINIC | Age: 21
End: 2021-02-18

## 2021-03-23 ENCOUNTER — LAB ENCOUNTER (OUTPATIENT)
Dept: LAB | Age: 21
End: 2021-03-23
Attending: PHYSICIAN ASSISTANT
Payer: MEDICAID

## 2021-03-23 DIAGNOSIS — Z00.00 ROUTINE GENERAL MEDICAL EXAMINATION AT A HEALTH CARE FACILITY: ICD-10-CM

## 2021-03-23 LAB
ALT SERPL-CCNC: 18 U/L
ANION GAP SERPL CALC-SCNC: 7 MMOL/L (ref 0–18)
AST SERPL-CCNC: 18 U/L (ref 15–37)
BUN BLD-MCNC: 15 MG/DL (ref 7–18)
BUN/CREAT SERPL: 17.2 (ref 10–20)
CALCIUM BLD-MCNC: 8.5 MG/DL (ref 8.5–10.1)
CHLORIDE SERPL-SCNC: 112 MMOL/L (ref 98–112)
CHOLEST SMN-MCNC: 128 MG/DL (ref ?–200)
CO2 SERPL-SCNC: 22 MMOL/L (ref 21–32)
CREAT BLD-MCNC: 0.87 MG/DL
GLUCOSE BLD-MCNC: 86 MG/DL (ref 70–99)
HDLC SERPL-MCNC: 59 MG/DL (ref 40–59)
LDLC SERPL CALC-MCNC: 56 MG/DL (ref ?–100)
NONHDLC SERPL-MCNC: 69 MG/DL (ref ?–130)
OSMOLALITY SERPL CALC.SUM OF ELEC: 292 MOSM/KG (ref 275–295)
PATIENT FASTING Y/N/NP: YES
PATIENT FASTING Y/N/NP: YES
POTASSIUM SERPL-SCNC: 4.3 MMOL/L (ref 3.5–5.1)
SODIUM SERPL-SCNC: 141 MMOL/L (ref 136–145)
TRIGL SERPL-MCNC: 66 MG/DL (ref 30–149)
TSI SER-ACNC: 1.69 MIU/ML (ref 0.36–3.74)
VLDLC SERPL CALC-MCNC: 13 MG/DL (ref 0–30)

## 2021-03-23 PROCEDURE — 85025 COMPLETE CBC W/AUTO DIFF WBC: CPT

## 2021-03-23 PROCEDURE — 84460 ALANINE AMINO (ALT) (SGPT): CPT

## 2021-03-23 PROCEDURE — 80061 LIPID PANEL: CPT

## 2021-03-23 PROCEDURE — 84450 TRANSFERASE (AST) (SGOT): CPT

## 2021-03-23 PROCEDURE — 85060 BLOOD SMEAR INTERPRETATION: CPT

## 2021-03-23 PROCEDURE — 84443 ASSAY THYROID STIM HORMONE: CPT

## 2021-03-23 PROCEDURE — 80048 BASIC METABOLIC PNL TOTAL CA: CPT

## 2021-03-23 PROCEDURE — 82306 VITAMIN D 25 HYDROXY: CPT

## 2021-03-23 PROCEDURE — 36415 COLL VENOUS BLD VENIPUNCTURE: CPT

## 2021-03-24 ENCOUNTER — TELEPHONE (OUTPATIENT)
Dept: FAMILY MEDICINE CLINIC | Facility: CLINIC | Age: 21
End: 2021-03-24

## 2021-03-24 ENCOUNTER — TELEPHONE (OUTPATIENT)
Dept: GASTROENTEROLOGY | Facility: CLINIC | Age: 21
End: 2021-03-24

## 2021-03-24 ENCOUNTER — HOSPITAL ENCOUNTER (EMERGENCY)
Facility: HOSPITAL | Age: 21
Discharge: HOME | End: 2021-03-24
Attending: EMERGENCY MEDICINE
Payer: MEDICAID

## 2021-03-24 VITALS
OXYGEN SATURATION: 97 % | RESPIRATION RATE: 15 BRPM | HEIGHT: 61 IN | DIASTOLIC BLOOD PRESSURE: 66 MMHG | HEART RATE: 61 BPM | WEIGHT: 113 LBS | TEMPERATURE: 99 F | BODY MASS INDEX: 21.34 KG/M2 | SYSTOLIC BLOOD PRESSURE: 101 MMHG

## 2021-03-24 DIAGNOSIS — D64.9 ANEMIA, UNSPECIFIED TYPE: Primary | ICD-10-CM

## 2021-03-24 PROBLEM — D50.9 IRON DEFICIENCY ANEMIA: Status: ACTIVE | Noted: 2021-03-24

## 2021-03-24 LAB
25(OH)D3 SERPL-MCNC: 18 NG/ML (ref 30–100)
ANTIBODY SCREEN: NEGATIVE
BASOPHILS # BLD AUTO: 0.04 X10(3) UL (ref 0–0.2)
BASOPHILS # BLD AUTO: 0.07 X10(3) UL (ref 0–0.2)
BASOPHILS NFR BLD AUTO: 0.6 %
BASOPHILS NFR BLD AUTO: 1.3 %
DEPRECATED HBV CORE AB SER IA-ACNC: 1 NG/ML
DEPRECATED RDW RBC AUTO: 41.5 FL (ref 35.1–46.3)
DEPRECATED RDW RBC AUTO: 42.6 FL (ref 35.1–46.3)
EOSINOPHIL # BLD AUTO: 0.03 X10(3) UL (ref 0–0.7)
EOSINOPHIL # BLD AUTO: 0.06 X10(3) UL (ref 0–0.7)
EOSINOPHIL NFR BLD AUTO: 0.5 %
EOSINOPHIL NFR BLD AUTO: 1.1 %
ERYTHROCYTE [DISTWIDTH] IN BLOOD BY AUTOMATED COUNT: 17.5 % (ref 11–15)
ERYTHROCYTE [DISTWIDTH] IN BLOOD BY AUTOMATED COUNT: 17.6 % (ref 11–15)
HCT VFR BLD AUTO: 23.1 %
HCT VFR BLD AUTO: 23.7 %
HGB BLD-MCNC: 6.2 G/DL
HGB BLD-MCNC: 6.6 G/DL
IMM GRANULOCYTES # BLD AUTO: 0.01 X10(3) UL (ref 0–1)
IMM GRANULOCYTES # BLD AUTO: 0.02 X10(3) UL (ref 0–1)
IMM GRANULOCYTES NFR BLD: 0.2 %
IMM GRANULOCYTES NFR BLD: 0.3 %
IRON SATURATION: 2 %
IRON SERPL-MCNC: 9 UG/DL
LYMPHOCYTES # BLD AUTO: 1.42 X10(3) UL (ref 1–4)
LYMPHOCYTES # BLD AUTO: 1.63 X10(3) UL (ref 1–4)
LYMPHOCYTES NFR BLD AUTO: 21.5 %
LYMPHOCYTES NFR BLD AUTO: 30.4 %
MCH RBC QN AUTO: 18.4 PG (ref 26–34)
MCH RBC QN AUTO: 18.7 PG (ref 26–34)
MCHC RBC AUTO-ENTMCNC: 26.8 G/DL (ref 31–37)
MCHC RBC AUTO-ENTMCNC: 27.8 G/DL (ref 31–37)
MCV RBC AUTO: 67.1 FL
MCV RBC AUTO: 68.5 FL
MONOCYTES # BLD AUTO: 0.33 X10(3) UL (ref 0.1–1)
MONOCYTES # BLD AUTO: 0.5 X10(3) UL (ref 0.1–1)
MONOCYTES NFR BLD AUTO: 6.1 %
MONOCYTES NFR BLD AUTO: 7.6 %
NEUTROPHILS # BLD AUTO: 3.27 X10 (3) UL (ref 1.5–7.7)
NEUTROPHILS # BLD AUTO: 3.27 X10(3) UL (ref 1.5–7.7)
NEUTROPHILS # BLD AUTO: 4.59 X10 (3) UL (ref 1.5–7.7)
NEUTROPHILS # BLD AUTO: 4.59 X10(3) UL (ref 1.5–7.7)
NEUTROPHILS NFR BLD AUTO: 60.9 %
NEUTROPHILS NFR BLD AUTO: 69.5 %
PLATELET # BLD AUTO: 329 10(3)UL (ref 150–450)
PLATELET # BLD AUTO: 334 10(3)UL (ref 150–450)
RBC # BLD AUTO: 3.37 X10(6)UL
RBC # BLD AUTO: 3.53 X10(6)UL
RH BLOOD TYPE: POSITIVE
TOTAL IRON BINDING CAPACITY: 484 UG/DL (ref 240–450)
TRANSFERRIN SERPL-MCNC: 325 MG/DL (ref 200–360)
WBC # BLD AUTO: 5.4 X10(3) UL (ref 4–11)
WBC # BLD AUTO: 6.6 X10(3) UL (ref 4–11)

## 2021-03-24 PROCEDURE — 86900 BLOOD TYPING SEROLOGIC ABO: CPT | Performed by: EMERGENCY MEDICINE

## 2021-03-24 PROCEDURE — 83540 ASSAY OF IRON: CPT | Performed by: EMERGENCY MEDICINE

## 2021-03-24 PROCEDURE — 99283 EMERGENCY DEPT VISIT LOW MDM: CPT

## 2021-03-24 PROCEDURE — 85025 COMPLETE CBC W/AUTO DIFF WBC: CPT | Performed by: EMERGENCY MEDICINE

## 2021-03-24 PROCEDURE — 86901 BLOOD TYPING SEROLOGIC RH(D): CPT | Performed by: EMERGENCY MEDICINE

## 2021-03-24 PROCEDURE — 86850 RBC ANTIBODY SCREEN: CPT | Performed by: EMERGENCY MEDICINE

## 2021-03-24 PROCEDURE — 82728 ASSAY OF FERRITIN: CPT | Performed by: EMERGENCY MEDICINE

## 2021-03-24 PROCEDURE — 36415 COLL VENOUS BLD VENIPUNCTURE: CPT

## 2021-03-24 PROCEDURE — 84466 ASSAY OF TRANSFERRIN: CPT | Performed by: EMERGENCY MEDICINE

## 2021-03-24 RX ORDER — FERROUS SULFATE 325(65) MG
325 TABLET ORAL
Qty: 30 TABLET | Refills: 0 | Status: SHIPPED | OUTPATIENT
Start: 2021-03-24 | End: 2021-04-23

## 2021-03-24 RX ORDER — MELATONIN
325
Qty: 30 TABLET | Refills: 0 | Status: SHIPPED | OUTPATIENT
Start: 2021-03-24 | End: 2021-03-24 | Stop reason: ALTCHOICE

## 2021-03-24 NOTE — ED PROVIDER NOTES
Patient Seen in: [facility] AND [facility] Emergency Department      History   Patient presents with:  Abnormal Labs    Stated Complaint: abnormal labs    HPI/Subjective:   HPI    Patient presents to the emergency department with abnormal laboratory studies.   Sh murmur heard. Pulmonary:      Effort: Pulmonary effort is normal. No respiratory distress. Breath sounds: Normal breath sounds. Abdominal:      General: There is no distension. Palpations: Abdomen is soft. Tenderness:  There is no abdom [address] Venessa Cain (BLOOD [place])[[id number]]                               Final result               ANTIBODY [id number]                                  Final result                 Please view results for these tests on the individual orders.    A

## 2021-03-24 NOTE — TELEPHONE ENCOUNTER
ER f/u 3/24     Teresita Mccann PA-C   3/24/2021  9:37 AM CDT Back to Top      Called and discussed lab results with patient. Advise patient to proceed to ER for further evaluation. Patient verbalized understanding.  Patient will go to ER near her school

## 2021-03-24 NOTE — TELEPHONE ENCOUNTER
ER notified me about patient with TRUDY--->no active bleeding issues, no menorrhagia. Hx of rectal bleeding intermittent, weeks ago. GI Clinical Staff:   Can you please call patient to schedule appt tomorrow with Kwadwo Coleman, use reserve 24 spot.  Thx

## 2021-03-24 NOTE — ED INITIAL ASSESSMENT (HPI)
Margarita Winkler was sent here d/t low iron result from yesterday. States she saw MD d/t fatigue and \"feeling cold all the time\".

## 2021-03-24 NOTE — TELEPHONE ENCOUNTER
I spoke to Zachary Caballero and she accepted appointment with Israel Shelley tomorrow at [facility] office. I gave her the office address and directions.     Future Appointments   Date Time Provider Kailyn Bourgeois   3/25/2021  1:30 PM JADEN Poe [facility]

## 2021-03-25 ENCOUNTER — PATIENT OUTREACH (OUTPATIENT)
Dept: CASE MANAGEMENT | Age: 21
End: 2021-03-25

## 2021-03-25 ENCOUNTER — TELEPHONE (OUTPATIENT)
Dept: HEMATOLOGY/ONCOLOGY | Facility: HOSPITAL | Age: 21
End: 2021-03-25

## 2021-03-25 ENCOUNTER — TELEPHONE (OUTPATIENT)
Dept: GASTROENTEROLOGY | Facility: CLINIC | Age: 21
End: 2021-03-25

## 2021-03-25 ENCOUNTER — OFFICE VISIT (OUTPATIENT)
Dept: GASTROENTEROLOGY | Facility: CLINIC | Age: 21
End: 2021-03-25
Payer: MEDICAID

## 2021-03-25 VITALS
TEMPERATURE: 97 F | BODY MASS INDEX: 21.9 KG/M2 | SYSTOLIC BLOOD PRESSURE: 97 MMHG | DIASTOLIC BLOOD PRESSURE: 61 MMHG | HEIGHT: 61 IN | HEART RATE: 78 BPM | WEIGHT: 116 LBS

## 2021-03-25 DIAGNOSIS — K59.00 CONSTIPATION, UNSPECIFIED CONSTIPATION TYPE: ICD-10-CM

## 2021-03-25 DIAGNOSIS — K62.5 BRBPR (BRIGHT RED BLOOD PER RECTUM): ICD-10-CM

## 2021-03-25 DIAGNOSIS — D50.9 IRON DEFICIENCY ANEMIA, UNSPECIFIED IRON DEFICIENCY ANEMIA TYPE: Primary | ICD-10-CM

## 2021-03-25 DIAGNOSIS — K21.9 GASTROESOPHAGEAL REFLUX DISEASE, UNSPECIFIED WHETHER ESOPHAGITIS PRESENT: ICD-10-CM

## 2021-03-25 PROCEDURE — 99244 OFF/OP CNSLTJ NEW/EST MOD 40: CPT | Performed by: NURSE PRACTITIONER

## 2021-03-25 PROCEDURE — 3074F SYST BP LT 130 MM HG: CPT | Performed by: NURSE PRACTITIONER

## 2021-03-25 PROCEDURE — 3078F DIAST BP <80 MM HG: CPT | Performed by: NURSE PRACTITIONER

## 2021-03-25 PROCEDURE — 3008F BODY MASS INDEX DOCD: CPT | Performed by: NURSE PRACTITIONER

## 2021-03-25 RX ORDER — POLYETHYLENE GLYCOL 3350, SODIUM CHLORIDE, SODIUM BICARBONATE, POTASSIUM CHLORIDE 420; 11.2; 5.72; 1.48 G/4L; G/4L; G/4L; G/4L
POWDER, FOR SOLUTION ORAL
Qty: 1 BOTTLE | Refills: 0 | Status: ON HOLD | OUTPATIENT
Start: 2021-03-25 | End: 2021-04-19

## 2021-03-25 RX ORDER — PANTOPRAZOLE SODIUM 40 MG/1
40 TABLET, DELAYED RELEASE ORAL
Qty: 30 TABLET | Refills: 3 | Status: SHIPPED | OUTPATIENT
Start: 2021-03-25 | End: 2021-05-21 | Stop reason: ALTCHOICE

## 2021-03-25 NOTE — H&P
[facility] - Gastroenterology                                                                                                               Reason for consult: er f/u    Requesting physician or provider: Jayda Hilliard Readings from Last 6 Encounters:  03/25/21 : 116 lb (52.6 kg)  03/24/21 : 113 lb (51.3 kg)  01/04/21 : 108 lb 4 oz (49.1 kg)  12/28/20 : 110 lb (49.9 kg)  12/22/20 : 110 lb (49.9 kg)  11/02/20 : 110 lb 1 oz (49.9 kg)       History, Medications, Allergies, HPI  GENITOURINARY: Negative for dysuria and frequency  MUSCULOSKELETAL: Negative for arthralgias and myalgias  NEUROLOGICAL: Negative for dizziness and headaches  BEHAVIOR/PSYCH: Negative for anxiety and poor appetite    PHYSICAL EXAM:   Blood pressure 97 Immature Granulocyte % 0.3 %     *Note: Due to a large number of results and/or encounters for the requested time period, some results have not been displayed. A complete set of results can be found in Results Review.        .  ASSESSMENT/PLAN:   Boni Edmondson to do this.       >>>Please note: if you were prescribed Suprep for the bowel prep and it is too expensive or not covered by insurance, it is okay to substitute Trilyte (or any similar generic prep).  This can be done by notifying the pharmacy or calling ou

## 2021-03-25 NOTE — TELEPHONE ENCOUNTER
Per chart review, pt was seen yesterday at [facility] and was seen for f/u by GI today. Closing encounter per department process.

## 2021-03-25 NOTE — PATIENT INSTRUCTIONS
-start miralax  -start pantoprazole 40 mg/daily  1. Schedule colonoscopy/egd with MAC w/ first avail md [Diagnosis: fe def anemia, brbpr, constipation, gerd]    2.  bowel prep from pharmacy (split trilyte)    3.  Hold iron supplement 7 days prior to [place], [address] [place]. All rights reserved. This information is not intended as a substitute for professional medical care. Always follow your healthcare professional's instructions.

## 2021-03-25 NOTE — PROGRESS NOTES
Patient called asking for assistance to sched a few ER appts    Dr. Seven You  Hematology and Oncology, ONCOLOGY, HEMATOLOGY  [address] [place] [address]   Ranjan Montes from Dr. Seven You office will call pt to sched appt      Dr. Tyler Memos

## 2021-03-25 NOTE — TELEPHONE ENCOUNTER
Scheduled for:  Colonoscopy [phone], EGD [address]  Provider Name: Dr. Kb Rivero    Date:  04/19/2021  Location:  [facility]  Sedation:  MAC  Time:  8:15am, (pt is aware to arrive at 7:15am)    Prep: Trilyte   Meds/Allergies Reconciled?:  Cintia/NP reviewed.       Gary Hamilton

## 2021-03-25 NOTE — TELEPHONE ENCOUNTER
[address] Natasha Herman from my perspective for [employer], please make sure ok from anesthesia protocol

## 2021-03-25 NOTE — TELEPHONE ENCOUNTER
lvmtcb to make phfu appt with dr Gilson Christopher tomorrow 3/26 double book per sebastian koch also Chantell needs to schedule iron infusion as well. cherelle

## 2021-03-25 NOTE — TELEPHONE ENCOUNTER
Dr. Cecile Cervantes,   Patient has been scheduled for a Colonoscopy/EGD w/MAC sedation at [address] [place] [address] on 04/19/2021 @ 8:15am. hgb is 6.2 from 03/23/2021 labs. Would [address] [place] [address] be okay for procedure or do you recommend [facility]? Please advise. Thank you!

## 2021-03-30 ENCOUNTER — OFFICE VISIT (OUTPATIENT)
Dept: HEMATOLOGY/ONCOLOGY | Facility: HOSPITAL | Age: 21
End: 2021-03-30
Attending: INTERNAL MEDICINE
Payer: MEDICAID

## 2021-03-30 VITALS
HEART RATE: 59 BPM | WEIGHT: 119 LBS | TEMPERATURE: 98 F | HEIGHT: 61 IN | OXYGEN SATURATION: 100 % | BODY MASS INDEX: 22.47 KG/M2 | DIASTOLIC BLOOD PRESSURE: 61 MMHG | SYSTOLIC BLOOD PRESSURE: 96 MMHG | RESPIRATION RATE: 16 BRPM

## 2021-03-30 VITALS
SYSTOLIC BLOOD PRESSURE: 90 MMHG | RESPIRATION RATE: 16 BRPM | OXYGEN SATURATION: 100 % | HEART RATE: 60 BPM | TEMPERATURE: 98 F | BODY MASS INDEX: 22 KG/M2 | WEIGHT: 119 LBS | DIASTOLIC BLOOD PRESSURE: 45 MMHG

## 2021-03-30 DIAGNOSIS — D50.0 IRON DEFICIENCY ANEMIA DUE TO CHRONIC BLOOD LOSS: Primary | ICD-10-CM

## 2021-03-30 DIAGNOSIS — R71.8 MICROCYTOSIS: ICD-10-CM

## 2021-03-30 DIAGNOSIS — D50.9 IRON DEFICIENCY ANEMIA: Primary | ICD-10-CM

## 2021-03-30 PROCEDURE — 96365 THER/PROPH/DIAG IV INF INIT: CPT

## 2021-03-30 PROCEDURE — 96376 TX/PRO/DX INJ SAME DRUG ADON: CPT

## 2021-03-30 PROCEDURE — 99204 OFFICE O/P NEW MOD 45 MIN: CPT | Performed by: INTERNAL MEDICINE

## 2021-03-30 NOTE — PROGRESS NOTES
Pt arrived for iron dextran infusion. Procedure explainded to pt, she verbalized understanding. PIV placed and test dose given without incident. Pt observed x 1 hour without incident.  Remainder of iron dextran given over 1 hour and pt observed after in f

## 2021-03-30 NOTE — CONSULTS
[facility] History and Physical    Patient Name: Carlito Woods   YOB: 2000   Medical Record Number: [id number]   CSN: [id number]   Attending Physician:  Gladis Moon MD       Date of Visit: 3/30/2021     Chief Complaint:  Severe anemia Take prep as directed by gastro office. May substitute with Trilyte/generic equivalent if needed. , Disp: 1 Bottle, Rfl: 0  •  Pantoprazole Sodium 40 MG Oral Tab EC, Take 1 tablet (40 mg total) by mouth every morning before breakfast., Disp: 30 tablet, Rfl: 03/24/2021    MCV 67.1 (L) 03/24/2021    MCH 18.7 (L) 03/24/2021    MCHC 27.8 (L) 03/24/2021    RDW 17.5 (H) 03/24/2021    .0 03/24/2021    MPV 10.5 (H) 01/14/2019     Lab Results   Component Value Date     03/23/2021    K 4.3 03/23/2021    CL

## 2021-04-16 ENCOUNTER — LAB ENCOUNTER (OUTPATIENT)
Dept: LAB | Age: 21
End: 2021-04-16
Attending: INTERNAL MEDICINE
Payer: MEDICAID

## 2021-04-16 DIAGNOSIS — Z01.818 PRE-OP TESTING: ICD-10-CM

## 2021-04-19 ENCOUNTER — HOSPITAL ENCOUNTER (OUTPATIENT)
Age: 21
Setting detail: HOSPITAL OUTPATIENT SURGERY
Discharge: HOME | End: 2021-04-19
Attending: INTERNAL MEDICINE | Admitting: INTERNAL MEDICINE
Payer: MEDICAID

## 2021-04-19 ENCOUNTER — ANESTHESIA (OUTPATIENT)
Dept: ENDOSCOPY | Age: 21
End: 2021-04-19
Payer: MEDICAID

## 2021-04-19 ENCOUNTER — ANESTHESIA EVENT (OUTPATIENT)
Dept: ENDOSCOPY | Age: 21
End: 2021-04-19
Payer: MEDICAID

## 2021-04-19 VITALS
RESPIRATION RATE: 20 BRPM | HEART RATE: 45 BPM | SYSTOLIC BLOOD PRESSURE: 104 MMHG | WEIGHT: 115 LBS | OXYGEN SATURATION: 100 % | HEIGHT: 61 IN | DIASTOLIC BLOOD PRESSURE: 71 MMHG | TEMPERATURE: 98 F | BODY MASS INDEX: 21.71 KG/M2

## 2021-04-19 DIAGNOSIS — Z01.818 PRE-OP TESTING: Primary | ICD-10-CM

## 2021-04-19 DIAGNOSIS — K59.00 CONSTIPATION, UNSPECIFIED CONSTIPATION TYPE: ICD-10-CM

## 2021-04-19 DIAGNOSIS — K62.5 BRBPR (BRIGHT RED BLOOD PER RECTUM): ICD-10-CM

## 2021-04-19 DIAGNOSIS — D50.9 IRON DEFICIENCY ANEMIA, UNSPECIFIED IRON DEFICIENCY ANEMIA TYPE: ICD-10-CM

## 2021-04-19 DIAGNOSIS — K21.9 GASTROESOPHAGEAL REFLUX DISEASE, UNSPECIFIED WHETHER ESOPHAGITIS PRESENT: ICD-10-CM

## 2021-04-19 PROCEDURE — 45378 DIAGNOSTIC COLONOSCOPY: CPT | Performed by: INTERNAL MEDICINE

## 2021-04-19 PROCEDURE — 43239 EGD BIOPSY SINGLE/MULTIPLE: CPT | Performed by: INTERNAL MEDICINE

## 2021-04-19 PROCEDURE — 88312 SPECIAL STAINS GROUP 1: CPT | Performed by: INTERNAL MEDICINE

## 2021-04-19 PROCEDURE — 99070 SPECIAL SUPPLIES PHYS/QHP: CPT | Performed by: INTERNAL MEDICINE

## 2021-04-19 PROCEDURE — 81025 URINE PREGNANCY TEST: CPT

## 2021-04-19 PROCEDURE — 88305 TISSUE EXAM BY PATHOLOGIST: CPT | Performed by: INTERNAL MEDICINE

## 2021-04-19 RX ORDER — SODIUM CHLORIDE, SODIUM LACTATE, POTASSIUM CHLORIDE, CALCIUM CHLORIDE 600; 310; 30; 20 MG/100ML; MG/100ML; MG/100ML; MG/100ML
INJECTION, SOLUTION INTRAVENOUS CONTINUOUS
Status: DISCONTINUED | OUTPATIENT
Start: 2021-04-19 | End: 2021-04-19

## 2021-04-19 RX ORDER — GLYCOPYRROLATE 0.2 MG/ML
INJECTION, SOLUTION INTRAMUSCULAR; INTRAVENOUS AS NEEDED
Status: DISCONTINUED | OUTPATIENT
Start: 2021-04-19 | End: 2021-04-19 | Stop reason: SURG

## 2021-04-19 RX ORDER — LIDOCAINE HYDROCHLORIDE 10 MG/ML
INJECTION, SOLUTION EPIDURAL; INFILTRATION; INTRACAUDAL; PERINEURAL AS NEEDED
Status: DISCONTINUED | OUTPATIENT
Start: 2021-04-19 | End: 2021-04-19 | Stop reason: SURG

## 2021-04-19 RX ADMIN — LIDOCAINE HYDROCHLORIDE 50 MG: 10 INJECTION, SOLUTION EPIDURAL; INFILTRATION; INTRACAUDAL; PERINEURAL at 08:26:00

## 2021-04-19 RX ADMIN — SODIUM CHLORIDE, SODIUM LACTATE, POTASSIUM CHLORIDE, CALCIUM CHLORIDE: 600; 310; 30; 20 INJECTION, SOLUTION INTRAVENOUS at 09:07:00

## 2021-04-19 RX ADMIN — GLYCOPYRROLATE 0.2 MG: 0.2 INJECTION, SOLUTION INTRAMUSCULAR; INTRAVENOUS at 08:26:00

## 2021-04-19 RX ADMIN — SODIUM CHLORIDE, SODIUM LACTATE, POTASSIUM CHLORIDE, CALCIUM CHLORIDE: 600; 310; 30; 20 INJECTION, SOLUTION INTRAVENOUS at 08:26:00

## 2021-04-19 NOTE — H&P
History & Physical Examination    Patient Name: Ji Ott  MRN: [id number]  CSN: [id number]  YOB: 2000    Diagnosis: anemia      ergocalciferol 1.25 MG (47477 UT) Oral Cap, Take 1 capsule (50,000 Units total) by mouth every 7 days. , Dis noted above. Jovany Phillips.  Lopez  4/19/2021  8:15 AM

## 2021-04-19 NOTE — ANESTHESIA PREPROCEDURE EVALUATION
Anesthesia PreOp Note    HPI:     Sharita Koenig is a 23year old female who presents for preoperative consultation requested by: Benoit Guzman MD    Date of Surgery: 4/19/2021    Procedure(s):  COLONOSCOPY  ESOPHAGOGASTRODUODENOSCOPY (EGD)  Indicati Reported on 1/4/2021 ), Disp: 2 tablet, Rfl: 0      lactated ringers infusion, , Intravenous, Continuous, Nazia Marroquin MD, Last Rate: 20 mL/hr at 04/19/21 0745, New Bag at 04/19/21 0745    No current [facility]-ordered outpatient medications on file.       Taya Buck 3.53 (L) 03/24/2021    HGB 6.6 (LL) 03/24/2021    HCT 23.7 (L) 03/24/2021    MCV 67.1 (L) 03/24/2021    MCH 18.7 (L) 03/24/2021    MCHC 27.8 (L) 03/24/2021    RDW 17.5 (H) 03/24/2021    .0 03/24/2021    URINEPREG Negative 04/19/2021     Lab Results

## 2021-04-19 NOTE — ANESTHESIA POSTPROCEDURE EVALUATION
Patient: Len Khan    Procedure Summary     Date: 04/19/21 Room / Location: [facility] 01 / [address] [facility] [address] 1 ENDO    Anesthesia Start: 9132 Anesthesia Stop: 7766    Procedures:       COLONOSCOPY (N/A )      ESOPHAGOGASTRODUODENOSCOPY (EGD) (N/A ) Gregor Minor

## 2021-04-19 NOTE — OPERATIVE REPORT
[facility] Endoscopy Report      Preoperative Diagnosis:  - anemia  - constipation      Postoperative Diagnosis:  - small internal hemorrhoids  - gastritis      Procedure:    Colonoscopy   Esophagogastroduodenoscopy       Surgeon:  Leonel Vazquez biopsies      Impression:  - small internal hemorrhoids  - gastritis    Recommendations:  - Post procedure instructions given  - Follow up on biopsies           Sarai Magana.  Coco Vasquez MD  4/19/2021  8:58 AM

## 2021-04-19 NOTE — ANESTHESIA POSTPROCEDURE EVALUATION
Patient: Breanna Lozada    Procedure Summary     Date: 04/19/21 Room / Location: [facility] 01 / [address] [facility] 1 ENDO    Anesthesia Start: 1304 Anesthesia Stop: 5765    Procedures:       COLONOSCOPY (N/A )      ESOPHAGOGASTRODUODENOSCOPY (EGD) (N/A ) Becky Oh

## 2021-04-20 ENCOUNTER — TELEPHONE (OUTPATIENT)
Dept: GASTROENTEROLOGY | Facility: CLINIC | Age: 21
End: 2021-04-20

## 2021-04-20 NOTE — TELEPHONE ENCOUNTER
Ihsan Monahan, JADEN  [facility] Clinical Staff  Nursing: I am sending triple therapy (pantoprazole, clarithromycin, amoxicillin) x2 weeks. This actually a pt of Cintia's. D/w her in office and she would prefer to f/u w/ pt.  I will send abx however pt harris

## 2021-04-20 NOTE — TELEPHONE ENCOUNTER
LMTCB. Please transfer to triage.     Megan Bender MD  [facility] Clinical Staff; JADNE Mayer  I wanted to get back to you with your colonoscopy/EGD results.  You have small internal hemorrhoids.       The upper endoscopy showed gastritis, testi not drink any alcohol while on the treatment. • You may take antibiotics with food to avoid stomach upset. What should I know about the treatment?   Many people experience sided effects while on these medications such as headache, nausea, etc. And will [phone].  -------------------------------------------------------------------------------------------------------------------------------------------------------------------------------------------------------------

## 2021-04-21 NOTE — TELEPHONE ENCOUNTER
Recall for 2 month follow up appointment to ensure H Pylori treatment effective entered into patient outreach in [address] [place] [address] Rd. Patient to make appointment with Lindy Sosa on or around 6/21/2021.     I reviewed below complete H Pylori teaching and result note

## 2021-04-26 ENCOUNTER — LAB ENCOUNTER (OUTPATIENT)
Dept: LAB | Age: 21
End: 2021-04-26
Attending: INTERNAL MEDICINE
Payer: MEDICAID

## 2021-04-26 DIAGNOSIS — D50.0 IRON DEFICIENCY ANEMIA DUE TO CHRONIC BLOOD LOSS: ICD-10-CM

## 2021-04-26 PROCEDURE — 83540 ASSAY OF IRON: CPT

## 2021-04-26 PROCEDURE — 36415 COLL VENOUS BLD VENIPUNCTURE: CPT

## 2021-04-26 PROCEDURE — 82607 VITAMIN B-12: CPT

## 2021-04-26 PROCEDURE — 85025 COMPLETE CBC W/AUTO DIFF WBC: CPT

## 2021-04-26 PROCEDURE — 84466 ASSAY OF TRANSFERRIN: CPT

## 2021-04-27 ENCOUNTER — OFFICE VISIT (OUTPATIENT)
Dept: HEMATOLOGY/ONCOLOGY | Facility: HOSPITAL | Age: 21
End: 2021-04-27
Attending: INTERNAL MEDICINE
Payer: MEDICAID

## 2021-04-27 VITALS
BODY MASS INDEX: 21.52 KG/M2 | TEMPERATURE: 99 F | DIASTOLIC BLOOD PRESSURE: 62 MMHG | HEIGHT: 60.98 IN | SYSTOLIC BLOOD PRESSURE: 103 MMHG | OXYGEN SATURATION: 100 % | HEART RATE: 67 BPM | WEIGHT: 114 LBS | RESPIRATION RATE: 16 BRPM

## 2021-04-27 DIAGNOSIS — D50.0 IRON DEFICIENCY ANEMIA DUE TO CHRONIC BLOOD LOSS: Primary | ICD-10-CM

## 2021-04-27 DIAGNOSIS — K62.5 RECTAL BLEEDING: ICD-10-CM

## 2021-04-27 PROCEDURE — 99214 OFFICE O/P EST MOD 30 MIN: CPT | Performed by: INTERNAL MEDICINE

## 2021-04-27 NOTE — PROGRESS NOTES
Cancer Center Progress Note    Patient Name: Dillan Hirsch   YOB: 2000   Medical Record Number: [id number]   Attending Physician: John Mares M.D.      Chief Complaint:  Severe anemia    Oncology History:  23year old with severe iron defic Cap, Take 1 capsule (50,000 Units total) by mouth every 7 days. , Disp: 12 capsule, Rfl: 3  •  fluconazole (DIFLUCAN) 150 MG Oral Tab, Tablet 1 tablet PO today, then repeat in 3 days. Total # 3 tablets. , Disp: 3 tablet, Rfl: 0  •  fluconazole (DIFLUCAN) 150 tabs due to intol and to see the response.   If there is consistent worsening of her anemia and iron deficiency, would recommend capsule study at that time    MDM: Mod, review of tests, drug management, chronic illness with side effect of treatment       Sa

## 2021-05-05 ENCOUNTER — HOSPITAL ENCOUNTER (EMERGENCY)
Facility: HOSPITAL | Age: 21
Discharge: HOME | End: 2021-05-06
Attending: EMERGENCY MEDICINE
Payer: MEDICAID

## 2021-05-05 DIAGNOSIS — K29.00 OTHER ACUTE GASTRITIS WITHOUT HEMORRHAGE: Primary | ICD-10-CM

## 2021-05-05 PROCEDURE — 85025 COMPLETE CBC W/AUTO DIFF WBC: CPT | Performed by: EMERGENCY MEDICINE

## 2021-05-05 PROCEDURE — 80076 HEPATIC FUNCTION PANEL: CPT | Performed by: EMERGENCY MEDICINE

## 2021-05-05 PROCEDURE — C9113 INJ PANTOPRAZOLE SODIUM, VIA: HCPCS | Performed by: EMERGENCY MEDICINE

## 2021-05-05 PROCEDURE — 96375 TX/PRO/DX INJ NEW DRUG ADDON: CPT

## 2021-05-05 PROCEDURE — 80048 BASIC METABOLIC PNL TOTAL CA: CPT | Performed by: EMERGENCY MEDICINE

## 2021-05-05 PROCEDURE — 96374 THER/PROPH/DIAG INJ IV PUSH: CPT

## 2021-05-05 PROCEDURE — 83690 ASSAY OF LIPASE: CPT | Performed by: EMERGENCY MEDICINE

## 2021-05-05 PROCEDURE — 81003 URINALYSIS AUTO W/O SCOPE: CPT | Performed by: EMERGENCY MEDICINE

## 2021-05-05 PROCEDURE — 81025 URINE PREGNANCY TEST: CPT

## 2021-05-05 PROCEDURE — 99284 EMERGENCY DEPT VISIT MOD MDM: CPT

## 2021-05-05 RX ORDER — METOCLOPRAMIDE HYDROCHLORIDE 5 MG/ML
5 INJECTION INTRAMUSCULAR; INTRAVENOUS ONCE
Status: COMPLETED | OUTPATIENT
Start: 2021-05-05 | End: 2021-05-05

## 2021-05-06 VITALS
DIASTOLIC BLOOD PRESSURE: 68 MMHG | WEIGHT: 114 LBS | OXYGEN SATURATION: 100 % | HEART RATE: 55 BPM | RESPIRATION RATE: 18 BRPM | BODY MASS INDEX: 21.52 KG/M2 | SYSTOLIC BLOOD PRESSURE: 99 MMHG | TEMPERATURE: 99 F | HEIGHT: 61 IN

## 2021-05-06 RX ORDER — METOCLOPRAMIDE 10 MG/1
5 TABLET ORAL 3 TIMES DAILY PRN
Qty: 10 TABLET | Refills: 0 | Status: SHIPPED | OUTPATIENT
Start: 2021-05-06 | End: 2021-05-09

## 2021-05-06 NOTE — ED PROVIDER NOTES
Patient Seen in: [facility] AND [facility] Emergency Department      History   Patient presents with:  Vomiting    Stated Complaint:     HPI/Subjective:   HPI    24-year-old female recently diagnosed and almost completing a course of treatment for H. pylori frida distress. Abdominal: Soft. Tenderness diffusely in the mid to lower midline abdomen. Mild epigastric pain. No guarding or peritoneal signs. Musculoskeletal: Normal range of motion. No edema or tenderness.    Neurological: Alert and oriented to person, Zofran but continue the other medications prescribed by Dr. Rizwana Escalona. She is to follow-up with them by phone and return with worsening or change.                              Disposition and Plan     Clinical Impression:  Other acute gastritis without hemorrh

## 2021-05-06 NOTE — ED INITIAL ASSESSMENT (HPI)
Pt presents to ED for vomiting x 3 days after being dx with h.pylori 2 weeks ago. Pt denies fevers. +lower abdominal pain +bloating. Pt denies diarrhea.

## 2021-05-21 NOTE — PROGRESS NOTES
HPI:     Depression  Visit Type: initial  Episode onset: August 2020.   Progression since onset: gradually worsening  Patient presents with the following symptoms: decreased concentration, depressed mood, excessive worry, fatigue, feelings of hopelessness, on file    Occupational History      Not on file    Tobacco Use      Smoking status: Never Smoker      Smokeless tobacco: Never Used    Vaping Use      Vaping Use: Never used    Substance and Sexual Activity      Alcohol use: No      Drug use: No      Sexu Psychiatric/Behavioral: Positive for agitation and decreased concentration. Negative for hallucinations, sleep disturbance and suicidal ideas. The patient is nervous/anxious.  The patient does not have insomnia.          05/21/21  0818   BP: 103/71   Puls

## 2021-06-03 ENCOUNTER — TELEPHONE (OUTPATIENT)
Dept: GASTROENTEROLOGY | Facility: CLINIC | Age: 21
End: 2021-06-03

## 2021-06-03 DIAGNOSIS — R11.2 NAUSEA AND VOMITING, INTRACTABILITY OF VOMITING NOT SPECIFIED, UNSPECIFIED VOMITING TYPE: Primary | ICD-10-CM

## 2021-06-03 DIAGNOSIS — A04.8 HELICOBACTER PYLORI (H. PYLORI) INFECTION: ICD-10-CM

## 2021-06-03 NOTE — TELEPHONE ENCOUNTER
Noted will review below recommendations from Dayna Garcia when patient calls back/will follow up if no return call.

## 2021-06-03 NOTE — TELEPHONE ENCOUNTER
Nursing;  I attempted to contact the pt. No answer no VM. reveiwed cln/egd w/ Dr. Melissa Rai 4/19/21  ti normal  Duodenal biopsy neg for celiac  As long as she was eating gluten at time of procedure would be accurate.     Treated w/ triple therapy for h.pylor

## 2021-06-03 NOTE — TELEPHONE ENCOUNTER
Amber Velasquez:    Patient called because she finished her treatment for H Pylori about 3 weeks to a month ago. She is concerned because she is vomiting almost every day.   She identified that bread/grains triggers this and is wondering if she has a gluten sensi

## 2021-06-03 NOTE — TELEPHONE ENCOUNTER
Sent call to Rn - Patient states her H Pylori symptoms not subsided - states when eats grains / bread, makes her sick and wants to know if this is a possible gluten allergy. Please call. Thank you.

## 2021-06-04 NOTE — TELEPHONE ENCOUNTER
Patient returned the call and I reviewed below complete message from Amber Velasquez and she voiced understanding. Patient wrote down number for central scheduling and will call to schedule ultrasound.

## 2021-07-09 ENCOUNTER — HOSPITAL ENCOUNTER (OUTPATIENT)
Age: 21
Discharge: HOME | End: 2021-07-09
Payer: MEDICAID

## 2021-07-09 ENCOUNTER — APPOINTMENT (OUTPATIENT)
Dept: ULTRASOUND IMAGING | Age: 21
End: 2021-07-09
Attending: PHYSICIAN ASSISTANT
Payer: MEDICAID

## 2021-07-09 VITALS
HEIGHT: 61 IN | TEMPERATURE: 98 F | DIASTOLIC BLOOD PRESSURE: 68 MMHG | SYSTOLIC BLOOD PRESSURE: 105 MMHG | WEIGHT: 110 LBS | OXYGEN SATURATION: 100 % | BODY MASS INDEX: 20.77 KG/M2 | RESPIRATION RATE: 16 BRPM | HEART RATE: 82 BPM

## 2021-07-09 DIAGNOSIS — R14.0 ABDOMINAL BLOATING: Primary | ICD-10-CM

## 2021-07-09 PROCEDURE — 76705 ECHO EXAM OF ABDOMEN: CPT | Performed by: PHYSICIAN ASSISTANT

## 2021-07-09 PROCEDURE — 99213 OFFICE O/P EST LOW 20 MIN: CPT | Performed by: PHYSICIAN ASSISTANT

## 2021-07-09 RX ORDER — SUCRALFATE 1 G/1
1 TABLET ORAL
Qty: 21 TABLET | Refills: 0 | Status: SHIPPED | OUTPATIENT
Start: 2021-07-09 | End: 2021-07-16

## 2021-07-09 RX ORDER — FAMOTIDINE 20 MG/1
20 TABLET ORAL 2 TIMES DAILY PRN
Qty: 30 TABLET | Refills: 0 | Status: SHIPPED | OUTPATIENT
Start: 2021-07-09 | End: 2021-07-27

## 2021-07-09 NOTE — ED INITIAL ASSESSMENT (HPI)
Pt states \"I've been having a lot of stomach and digestive issues lately. \" says she was dx with hpylori two months ago and feels like her sx never fully resolved. C/o bloating, nausea, loss of appetite and diarrhea.

## 2021-07-09 NOTE — ED PROVIDER NOTES
Patient Seen in: [facility]      History   Patient presents with:  Abdominal Pain    Stated Complaint: ABDOMINAL PAIN NAUSEAVOMITING LOSS OF APPITITE BLOATING RECENTLY DIAGNOSISED WI*    HPI/Subjective:   HPI    24 yo female here for evaluati normal.      Left Ear: External ear normal.      Nose: Nose normal.      Mouth/Throat:      Mouth: Mucous membranes are moist.   Eyes:      Extraocular Movements: Extraocular movements intact. Pupils: Pupils are equal, round, and reactive to light. Discharge Medication List    START taking these medications    famoTIDine (PEPCID) 20 MG Oral Tab  Take 1 tablet (20 mg total) by mouth 2 (two) times daily as needed for Heartburn.   Qty: 30 tablet Refills: 0    sucralfate 1 g Oral Tab  Take 1 tablet (1 g t

## 2021-07-26 ENCOUNTER — LAB ENCOUNTER (OUTPATIENT)
Dept: LAB | Age: 21
End: 2021-07-26
Attending: INTERNAL MEDICINE
Payer: MEDICAID

## 2021-07-26 DIAGNOSIS — D50.0 IRON DEFICIENCY ANEMIA DUE TO CHRONIC BLOOD LOSS: ICD-10-CM

## 2021-07-26 LAB
BASOPHILS # BLD AUTO: 0.07 X10(3) UL (ref 0–0.2)
BASOPHILS NFR BLD AUTO: 0.9 %
DEPRECATED HBV CORE AB SER IA-ACNC: 7.2 NG/ML
DEPRECATED RDW RBC AUTO: 46.9 FL (ref 35.1–46.3)
EOSINOPHIL # BLD AUTO: 0.02 X10(3) UL (ref 0–0.7)
EOSINOPHIL NFR BLD AUTO: 0.3 %
ERYTHROCYTE [DISTWIDTH] IN BLOOD BY AUTOMATED COUNT: 14.4 % (ref 11–15)
HCT VFR BLD AUTO: 38.4 %
HGB BLD-MCNC: 12.2 G/DL
IMM GRANULOCYTES # BLD AUTO: 0.03 X10(3) UL (ref 0–1)
IMM GRANULOCYTES NFR BLD: 0.4 %
IRON SATURATION: 16 %
IRON SERPL-MCNC: 68 UG/DL
LYMPHOCYTES # BLD AUTO: 1.9 X10(3) UL (ref 1–4)
LYMPHOCYTES NFR BLD AUTO: 23.9 %
MCH RBC QN AUTO: 28.2 PG (ref 26–34)
MCHC RBC AUTO-ENTMCNC: 31.8 G/DL (ref 31–37)
MCV RBC AUTO: 88.9 FL
MONOCYTES # BLD AUTO: 0.7 X10(3) UL (ref 0.1–1)
MONOCYTES NFR BLD AUTO: 8.8 %
NEUTROPHILS # BLD AUTO: 5.22 X10 (3) UL (ref 1.5–7.7)
NEUTROPHILS # BLD AUTO: 5.22 X10(3) UL (ref 1.5–7.7)
NEUTROPHILS NFR BLD AUTO: 65.7 %
PLATELET # BLD AUTO: 180 10(3)UL (ref 150–450)
RBC # BLD AUTO: 4.32 X10(6)UL
TOTAL IRON BINDING CAPACITY: 422 UG/DL (ref 240–450)
TRANSFERRIN SERPL-MCNC: 283 MG/DL (ref 200–360)
WBC # BLD AUTO: 7.9 X10(3) UL (ref 4–11)

## 2021-07-26 PROCEDURE — 85025 COMPLETE CBC W/AUTO DIFF WBC: CPT

## 2021-07-26 PROCEDURE — 82728 ASSAY OF FERRITIN: CPT

## 2021-07-26 PROCEDURE — 84466 ASSAY OF TRANSFERRIN: CPT

## 2021-07-26 PROCEDURE — 83540 ASSAY OF IRON: CPT

## 2021-07-26 PROCEDURE — 36415 COLL VENOUS BLD VENIPUNCTURE: CPT

## 2021-07-27 ENCOUNTER — OFFICE VISIT (OUTPATIENT)
Dept: HEMATOLOGY/ONCOLOGY | Facility: HOSPITAL | Age: 21
End: 2021-07-27
Attending: INTERNAL MEDICINE
Payer: MEDICAID

## 2021-07-27 ENCOUNTER — TELEPHONE (OUTPATIENT)
Dept: HEMATOLOGY/ONCOLOGY | Facility: HOSPITAL | Age: 21
End: 2021-07-27

## 2021-07-27 VITALS
SYSTOLIC BLOOD PRESSURE: 96 MMHG | RESPIRATION RATE: 16 BRPM | TEMPERATURE: 98 F | OXYGEN SATURATION: 100 % | DIASTOLIC BLOOD PRESSURE: 67 MMHG | HEART RATE: 58 BPM

## 2021-07-27 DIAGNOSIS — A04.8 HELICOBACTER PYLORI (H. PYLORI) INFECTION: ICD-10-CM

## 2021-07-27 DIAGNOSIS — D50.0 IRON DEFICIENCY ANEMIA DUE TO CHRONIC BLOOD LOSS: Primary | ICD-10-CM

## 2021-07-27 DIAGNOSIS — K90.9 MALABSORPTION OF IRON: ICD-10-CM

## 2021-07-27 DIAGNOSIS — R11.2 NAUSEA AND VOMITING, INTRACTABILITY OF VOMITING NOT SPECIFIED, UNSPECIFIED VOMITING TYPE: ICD-10-CM

## 2021-07-27 PROCEDURE — 99214 OFFICE O/P EST MOD 30 MIN: CPT | Performed by: INTERNAL MEDICINE

## 2021-07-27 NOTE — PROGRESS NOTES
Cancer Center Progress Note    Patient Name: Jaiden Hu   YOB: 2000   Medical Record Number: [id number]   Attending Physician: Elvia Wall M.D.      Chief Complaint:  Severe anemia    Oncology History:  23year old with severe iron defic Symmetric expansion, nonlabored breathing  Extremities: No edema, cyanosis, or bruising  Neurological: motor strength grossly intact, MA4E  Psych: appropriate mood and affect      Laboratory assessed and reviewed:  Lab Results   Component Value Date    GLU

## 2021-08-05 ENCOUNTER — OFFICE VISIT (OUTPATIENT)
Dept: HEMATOLOGY/ONCOLOGY | Facility: HOSPITAL | Age: 21
End: 2021-08-05
Attending: INTERNAL MEDICINE
Payer: MEDICAID

## 2021-08-05 VITALS
SYSTOLIC BLOOD PRESSURE: 93 MMHG | DIASTOLIC BLOOD PRESSURE: 56 MMHG | TEMPERATURE: 98 F | RESPIRATION RATE: 16 BRPM | HEART RATE: 51 BPM | OXYGEN SATURATION: 100 %

## 2021-08-05 DIAGNOSIS — D50.0 IRON DEFICIENCY ANEMIA DUE TO CHRONIC BLOOD LOSS: ICD-10-CM

## 2021-08-05 DIAGNOSIS — K90.9 MALABSORPTION OF IRON: Primary | ICD-10-CM

## 2021-08-05 PROCEDURE — 96374 THER/PROPH/DIAG INJ IV PUSH: CPT

## 2021-08-05 NOTE — PROGRESS NOTES
Pt arrived for injectafer infusion 1 of 2. Procedure explainded to pt, she verbalized understanding. PIV placed to left forearm vein, reclined and Injectafer given without incident. PIV removed and dsg to site. Pt discharged amb and stable.     Lab Result

## 2021-08-07 ENCOUNTER — APPOINTMENT (OUTPATIENT)
Dept: LAB | Age: 21
End: 2021-08-07
Attending: NURSE PRACTITIONER
Payer: MEDICAID

## 2021-08-07 PROCEDURE — 87338 HPYLORI STOOL AG IA: CPT | Performed by: INTERNAL MEDICINE

## 2021-08-12 LAB — H PYLORI AG STL QL IA: NEGATIVE

## 2021-08-25 ENCOUNTER — OFFICE VISIT (OUTPATIENT)
Dept: HEMATOLOGY/ONCOLOGY | Facility: HOSPITAL | Age: 21
End: 2021-08-25
Attending: INTERNAL MEDICINE
Payer: MEDICAID

## 2021-08-25 VITALS
OXYGEN SATURATION: 100 % | DIASTOLIC BLOOD PRESSURE: 55 MMHG | RESPIRATION RATE: 16 BRPM | HEART RATE: 69 BPM | SYSTOLIC BLOOD PRESSURE: 106 MMHG | TEMPERATURE: 98 F

## 2021-08-25 DIAGNOSIS — D50.0 IRON DEFICIENCY ANEMIA DUE TO CHRONIC BLOOD LOSS: ICD-10-CM

## 2021-08-25 DIAGNOSIS — K90.9 MALABSORPTION OF IRON: Primary | ICD-10-CM

## 2021-08-25 PROCEDURE — 96374 THER/PROPH/DIAG INJ IV PUSH: CPT

## 2021-08-25 NOTE — PROGRESS NOTES
Pt here for injectafer 2 of 2. Observed for 30 minutes post infusion. Appeared to tolerate, no s/s of rxn noted. Discharged ambulating independently.     Lab Results   Component Value Date    HGB 12.2 07/26/2021    HCT 38.4 07/26/2021    ALEISHA 7.2 (L) 07/26/

## 2021-09-15 ENCOUNTER — OFFICE VISIT (OUTPATIENT)
Dept: FAMILY MEDICINE CLINIC | Facility: CLINIC | Age: 21
End: 2021-09-15
Payer: MEDICAID

## 2021-09-15 ENCOUNTER — LAB ENCOUNTER (OUTPATIENT)
Dept: LAB | Age: 21
End: 2021-09-15
Attending: PHYSICIAN ASSISTANT
Payer: MEDICAID

## 2021-09-15 VITALS
HEIGHT: 61 IN | HEART RATE: 81 BPM | SYSTOLIC BLOOD PRESSURE: 90 MMHG | BODY MASS INDEX: 21.14 KG/M2 | WEIGHT: 112 LBS | DIASTOLIC BLOOD PRESSURE: 55 MMHG

## 2021-09-15 DIAGNOSIS — N92.6 IRREGULAR MENSES: Primary | ICD-10-CM

## 2021-09-15 DIAGNOSIS — N92.6 IRREGULAR MENSES: ICD-10-CM

## 2021-09-15 LAB
CONTROL LINE PRESENT WITH A CLEAR BACKGROUND (YES/NO): YES YES/NO
FSH SERPL-ACNC: 6.2 MIU/ML
HCG SERPL QL: NEGATIVE
LH SERPL-ACNC: 8.1 MIU/ML
PROLACTIN SERPL-MCNC: 12.2 NG/ML

## 2021-09-15 PROCEDURE — 3008F BODY MASS INDEX DOCD: CPT | Performed by: PHYSICIAN ASSISTANT

## 2021-09-15 PROCEDURE — 84703 CHORIONIC GONADOTROPIN ASSAY: CPT

## 2021-09-15 PROCEDURE — 3078F DIAST BP <80 MM HG: CPT | Performed by: PHYSICIAN ASSISTANT

## 2021-09-15 PROCEDURE — 83002 ASSAY OF GONADOTROPIN (LH): CPT

## 2021-09-15 PROCEDURE — 83001 ASSAY OF GONADOTROPIN (FSH): CPT

## 2021-09-15 PROCEDURE — 99213 OFFICE O/P EST LOW 20 MIN: CPT | Performed by: PHYSICIAN ASSISTANT

## 2021-09-15 PROCEDURE — 36415 COLL VENOUS BLD VENIPUNCTURE: CPT

## 2021-09-15 PROCEDURE — 81025 URINE PREGNANCY TEST: CPT | Performed by: PHYSICIAN ASSISTANT

## 2021-09-15 PROCEDURE — 3074F SYST BP LT 130 MM HG: CPT | Performed by: PHYSICIAN ASSISTANT

## 2021-09-15 PROCEDURE — 84402 ASSAY OF FREE TESTOSTERONE: CPT

## 2021-09-15 PROCEDURE — 84403 ASSAY OF TOTAL TESTOSTERONE: CPT

## 2021-09-15 PROCEDURE — 84146 ASSAY OF PROLACTIN: CPT

## 2021-09-15 RX ORDER — ERGOCALCIFEROL 1.25 MG/1
CAPSULE ORAL
COMMUNITY
Start: 2021-07-06 | End: 2021-10-04

## 2021-09-15 RX ORDER — LIDOCAINE HYDROCHLORIDE 20 MG/ML
SOLUTION ORAL; TOPICAL
COMMUNITY
Start: 2021-07-06 | End: 2021-10-04

## 2021-09-15 NOTE — PROGRESS NOTES
HPI:     HPI  24year-old female is here in the office complaining irregular menstrual. Her LMP was 05/20/21. Patient stopped taking BCP in May. Since then, patient does not have menstrual period.   Patient denies of chest pain, SOB, N/V/C/D, fever, dizzine file    Social History Narrative      ** Merged History Encounter **           Social Determinants of Health  Financial Resource Strain:       Difficulty of Paying Living Expenses: Not on file  Food Insecurity:       Worried About Running Out of Food in th for rash. 09/15/21  1043   BP: 90/55   Pulse: 81   Weight: 112 lb (50.8 kg)   Height: 5' 1\" (1.549 m)     Body mass index is 21.16 kg/m². Physical Exam:   Physical Exam  Vitals reviewed.    Constitutional:       General: She is not in acute dist

## 2021-09-22 LAB
TESTOSTERONE, FREE, S: 0.6 NG/DL
TESTOSTERONE, TOTAL, S: 35 NG/DL

## 2021-10-03 PROBLEM — T39.091A SALICYLIC ACID SALTS OVERDOSE: Status: ACTIVE | Noted: 2021-10-03

## 2021-10-03 PROBLEM — T50.902A SUICIDAL OVERDOSE, INITIAL ENCOUNTER (HCC): Status: ACTIVE | Noted: 2021-10-03

## 2021-10-03 PROBLEM — T39.092A SALICYLATE OVERDOSE, INTENTIONAL SELF-HARM, INITIAL ENCOUNTER (HCC): Status: ACTIVE | Noted: 2021-10-03

## 2021-10-03 PROBLEM — F32.2 SEVERE MAJOR DEPRESSION, SINGLE EPISODE, WITHOUT PSYCHOTIC FEATURES (HCC): Status: ACTIVE | Noted: 2021-10-03

## 2021-10-03 NOTE — PROGRESS NOTES
Aspirin levels cont to go up. At this point will admit. Cont q2 hour salicylate levels. If repeat in 2 hours cont to elevate will start bicarb drip. Admit to PCU. Consult renal and psych.

## 2021-10-03 NOTE — ED QUICK NOTES
Pt is currently sleeping on cart, mother at bedside. During initial assessment, pt was able to gesture yes or no by nodding head.

## 2021-10-03 NOTE — ED QUICK NOTES
Patient is awake/alert. No distress. Mom at bedside. Waiting for medical admisson. Patient tolerating PO intake.   1:1 observation continues

## 2021-10-03 NOTE — ED QUICK NOTES
Patient is sleeping at this time. Respirations are spontaneous/nonlabored/regular rate and rhythm. Patients mom at the bedside. MD aware of BP, NS infusing. 1:1 observation continues.

## 2021-10-03 NOTE — ED NOTES
Writer spoke with Kenneth Danielle, who reported plan to complete petition after having met with family.  Writer obtained information for collateral for RN to place on petition:    Kaden Jackson, Sister Yenny, Mother, [phone]  [place] Jaime

## 2021-10-03 NOTE — BH LEVEL OF CARE ASSESSMENT
Crisis Evaluation Assessment    Edith Kent YOB: 2000   Age 24year old MRN [id number]   Location [address] Attending Shemar Betts MD      Patient's legal sex: female  Patient identifies as: female  Sharla is denied. Hallucinations are denied and there e is no evidence that she is responding to internal stimuli. Suicide Risk Assessments:    Source of information for CSSR: Patient  In what setting is the screener performed?: in person  1.  Have you wis above        Non-Suicidal Self-Injury:   Denied        Access to Means:  Access to Means  Has access to means to attempt suicide or harm others or property: Yes  Description of Access: has access to OTC medication and household items  Access to [employer] Relevant Social History:  Jovani Daily reports that her 25year old sister, (who she confided in), has been treated for depression. Jovani Daily [place] an apartment on the school campus with a roommate and another house mate.  Jovani Daily broke up with her boyfr (comment) (becomes tearful when discussing her situation)  Stability of Affect: Stable  Attitude toward staff: Co-operative; Open  Speech  Rate of Speech: Appropriate  Flow of Speech: Appropriate  Intensity of Volume: Soft  Clarity: Clear  Cognition  Gloria presents as tearful and markedly depressed. Affect is flat. BAL was 222 at 0245. UDS was negative. Maury Ceja is cooperative and wants help.           Risk/Protective Factors  Protective Factors: family and firneds    Level of Care Recommendations  Consul

## 2021-10-03 NOTE — ED NOTES
Voluntary and Rights signed and scanned into Epic. Spoke with Dr Marissa Murray. Aline Oro has not yet been medically cleared. Updated Aline Oro that she has not yet been medically cleared.

## 2021-10-03 NOTE — ED NOTES
LATE ENTRY:  Spoke to Joseph, [address] @ 7714. Requested that she contact me when Elizabeth Moreno is more alert and cane be assessed.

## 2021-10-03 NOTE — ED QUICK NOTES
Patient transported to PCU via cart and montior. Mom with patient. Patient in no distress. Awake and alert. Bicarb drip infusing.   Original petition and certificate provided to PCU nurse

## 2021-10-03 NOTE — PROGRESS NOTES
10/03/21 Alli 30   Have you been practicing social distancing? Yes   Have you been wearing a mask when in the community? Yes   Are the people you live with following social distancing and wearing a mask?  Yes  (Lives in an ap

## 2021-10-03 NOTE — ED QUICK NOTES
Patient continues sleeping. No distress. Respirations are spontaneous/nonlabored/regular rate and rhythm. BP 95/43. Mom at the bedside.   1:1 observation continues

## 2021-10-03 NOTE — ED QUICK NOTES
Spoke to Tra from [employer]. Standard od labs, aspirin, tylenol, etoh, cmp. Aspirin levels q2h until peak and 2 consecutive decreasing levels. If any asa level >30mg/dL, call Poison Control for treatment.  Watch for asa toxicity; diaphoretic

## 2021-10-03 NOTE — ED QUICK NOTES
Spoke to [employer]. Continue to monitor pt for toxicity symptoms. If pt becomes more awake & alert, can recommend activated charcoal. Will follow up @2490 after next salicylate draw.

## 2021-10-03 NOTE — ED QUICK NOTES
Pt roommate Adriana Savers presented this RN with a bottle of full dose aspirin, containing 12 tablets. Bottle is 100ct. 88 tablets missing and roommate Dariana Savers sts she is unsure of how full the bottle was.

## 2021-10-03 NOTE — ED QUICK NOTES
Patient laying on bed with mom at bedside. Patient in no distress.   Patient is calm and cooperative with flat affect

## 2021-10-03 NOTE — ED PROVIDER NOTES
Patient Seen in: [facility] AND [facility] Emergency Department    History   Patient presents with:  Eval-P    Stated Complaint: si     HPI    19-year-old female with past medical history of gastritis, anemia presenting in company of friends/family in setting of a (36.4 °C)   Temp src Oral   SpO2 99 %   O2 Device None (Room air)       Current:/83   Pulse 84   Temp 97.6 °F (36.4 °C) (Oral)   Resp 20   Wt 51 kg   LMP 05/05/2021   SpO2 99%   BMI 21.24 kg/m²         Physical Exam   Constitutional: No distress.  Ine result                 Please view results for these tests on the individual orders.    DRUG ABUSE PANEL 10 SCREEN   SALICYLATE, SERUM   SALICYLATE, SERUM   RAINBOW DRAW LAVENDER   RAINBOW DRAW LIGHT GREEN   RAINBOW DRAW GOLD   CBC W/ DIFFERENTIAL

## 2021-10-03 NOTE — ED INITIAL ASSESSMENT (HPI)
Pt here w/ friends from dorm, who report pt took \"handful\" of aspirins, stated she does not want to be here anymore, and is intoxicated on ETOH. Friends deny drug use. Friends unsure of how much ASA she took or how much was in bottle beforehand.  Pt awake

## 2021-10-04 NOTE — PLAN OF CARE
Problem: Patient Centered Care  Goal: Patient preferences are identified and integrated in the patient's plan of care  Description: Interventions:  - What would you like us to know as we care for you?   - Provide timely, complete, and accurate informatio Problem: NEUROLOGICAL - ADULT  Goal: Achieves stable or improved neurological status  Description: INTERVENTIONS  - Assess for and report changes in neurological status  - Initiate measures to prevent increased intracranial pressure  - Maintain blood pre

## 2021-10-04 NOTE — PROGRESS NOTES
[facility]    Progress Note    Mikayla Perales Patient Status:  Inpatient    9/3/2000 MRN [id number]   Location [facility] 2W/SW Attending Papi Amaral MD   [address] Day # 1 PCP Alis Steven.  DO Coretta     Subjective:   Subject ([facility]  -Intentional overdose  -Salicylate levels downtrending  -Per poison control discontinue salicylate level checks  -BMP reviewed patient is currently not metabolic acidosis  -Currently on bicarb drip per nephro  -Appreciate commendations       Severe

## 2021-10-04 NOTE — CONSULTS
[facility]    PATIENT'S NAME: Heidi Rodgers   ATTENDING PHYSICIAN: Reyes Pinzon MD   CONSULTING PHYSICIAN: Isabell Andrade MD   PATIENT ACCOUNT#:   [id number]    LOCATION:  2WSW [address] [facility] RECORD #:   [id number]       DATE OF BI lymphadenopathy. LUNGS:  Clear to auscultation and percussion. HEART:  Regular rate and rhythm without gallops, murmurs, or rubs. ABDOMEN:  Soft, flat, nontender without organomegaly, masses, or bruits. EXTREMITIES:  No clubbing, cyanosis, or edema.   D

## 2021-10-04 NOTE — CONSULTS
[facility]    Report of Consultation    Satinder Trottero Patient Status:  Inpatient    9/3/2000 MRN [id number]   Location [facility] 2W/SW Attending Gaby Shaikh MD   [facility] Day # 0 PCP April Gonzalez.  DO Coretta     Date of Admi she was drinking socially when she had intense bout of depression and sadness with intense suicidal ideation. Patient ended up in the washroom trying to overdose on Tylenol while she is a crying.   Her friend good attention to the crying and came to check Intravenous, Continuous  escitalopram (LEXAPRO) tablet 5 mg, 5 mg, Oral, Nightly  ARIPiprazole (ABILIFY) tab 1 mg, 1 mg, Oral, Nightly  temazepam (RESTORIL) cap 15 mg, 15 mg, Oral, Nightly PRN  sodium bicarbonate 75 mEq in Dextrose-NaCl 5-0.45 % 1,000 mL i mood.  Patient tearful throughout the evaluation. Thought process:  Linear and sequential.  Thought content: Patient today admitted to having suicidal ideation with intentional overdose in attempt to kill herself.   Perceptions: No hallucinations reported With Differential With Platelet      Magnesium      Magnesium      Phosphorus      Comp Metabolic Panel (14)      Renal Function Panel      Magnesium      SARS-CoV-2 by PCR (GENEXPERT)      Meds This Visit:  Requested Prescriptions      No prescriptions re

## 2021-10-04 NOTE — PROGRESS NOTES
[facility]  Nephrology Daily Progress Note    Sharita Koenig  [id number]  24year old      HPI:   Sharita Koenig is a 24year old female. Awake and alert. No c/o and tolerating liquids.         ROS:     Constitutional:  Negative for de 10/04/2021    HGB 12.4 10/04/2021    HCT 39.3 10/04/2021    .0 10/04/2021    CREATSERUM 0.63 10/04/2021    BUN 11 10/04/2021     10/04/2021    K 3.6 10/04/2021     10/04/2021    CO2 26.0 10/04/2021    GLU 85 10/04/2021    CA 8.1 10/04/20 features ([address]      Overall better. Last salicylate level down to 17.8. No met acidosis. Replace K. Stop IVF. OK from renal stand point to transfer to in pt psych. Will discontinue active follow up. Please call if needed. Discussed with mother.

## 2021-10-04 NOTE — PLAN OF CARE
Pt alert and oriented x 4. Bradycardic, Md aware. Poison control okay with discontinuing salicylate lvl checks. No complaints of pain. Bed in lowest position, sitter at bedside.     Problem: CARDIOVASCULAR - ADULT  Goal: Maintains optimal cardiac output and optimize cerebral perfusion and minimize risk of hemorrhage  - Monitor temperature, glucose, and sodium.  Initiate appropriate interventions as ordered    10/4/2021 0606 by Jyoti Judd RN  Outcome: Progressing     Problem: SELF HARM  Goal: Patie

## 2021-10-04 NOTE — BH PROGRESS NOTE
Inpatient Psychiatric Transfer summary  10/4/2021 at 1824-1946: Psych Liaison called [facility] and scheduled next available ambulance, at which time the reported next available ambulance time was in 2-3 hours.     Isabel ARAGON [facility]    10/4/2021 at 3145-6172: [facility] [place]    10/4/2021 4171-8606: Psych Liaison called [facility] - [phone]) and spoke with Geo Wiseman who staffed case and reported that they are very tight on bed space but will still review the referral. Geo Wiseman requested that pa

## 2021-10-04 NOTE — H&P
[facility]    PATIENT'S NAME: Johnney Goldberg PHYSICIAN: Jai Aranda MD   PATIENT ACCOUNT#:   [id number]    LOCATION:  [address] [place] [address] RECORD #:   [id number]       YOB: 2000  ADMISSION DATE:       10/03/ Denies any illicits or cigarette smoking. REVIEW OF SYSTEMS:  A 10-point review of systems has been obtained and otherwise negative. PHYSICAL EXAMINATION:    GENERAL:   Patient lying in bed, appears to be in no acute distress at this time.   She i Continue electrolyte replacement protocol. We will continue to monitor closely. 3.   We will continue to monitor on telemetry. 4. VTE prophylaxis. Currently, we will hold pharmacologic VTE prophylaxis in the setting of aspirin overdose.   5.   Disposi

## 2021-10-05 NOTE — PROGRESS NOTES
Patient is a 24year old  single female with no previous psychiatric official diagnosis who presented to the hospital with overdose on 10–12 Tylenol and attempt to kill herself.       Consult Duration     The patient seen for over 25-minute, agustin PRN      ergocalciferol 1.25 MG (99633 UT) Oral Cap,   FEROSUL 325 (65 Fe) MG Oral Tab,         Allergies  No Known Allergies      Review of Systems:     As by Admitting/Attending    Results:     Laboratory Data:  Lab Results   Component Value Date    WBC presentation and report. Memory: Grossly intact. Intellect: Average. Judgment and insight are impaired due to the severity of her depression and recent overdose.       Impression:     Impression:        Suicidal overdose, initial encounter ([facility] [address]  Major d

## 2021-10-05 NOTE — DISCHARGE SUMMARY
Hospital Discharge Diagnoses: salicylate overdose    Lace+ Score: 25 ( )  59-90 High Risk  29-58 Medium Risk  0-28   Low Risk.     TCM Follow-Up Recommendation:  LACE < 29: Low Risk of readmission after discharge from the hospital; Still recommend for TCM f

## 2021-10-05 NOTE — PLAN OF CARE
Pt discharged, med rec completed by MD. Pt belongings given to [facility] employee, pt cell-phone confirmed in the bag. Ivs discontinued.      Problem: Risk for Violence-Violent Restraints/Seclusion  Goal: Patient will not express any violent or raffaele specific interventions  Outcome: Completed     Problem: CARDIOVASCULAR - ADULT  Goal: Maintains optimal cardiac output and hemodynamic stability  Description: INTERVENTIONS:  - Monitor vital signs, rhythm, and trends  - Monitor for bleeding, hypotension an self-harm  Description: INTERVENTIONS:  - Initiate Suicide Precautions, including constant direct observation by a care companion, sitter or RN  - Initiate consults as appropriate with Behavioral Health, Spiritual Care  - Evaluate care setting prior to adm

## 2021-10-06 NOTE — DISCHARGE SUMMARY
[facility]    PATIENT'S NAME: Agueda Fothergill PHYSICIAN: Tahira Martínez MD   PATIENT ACCOUNT#:   [id number]    LOCATION:  [address] [place] [address] RECORD #:   [id number]       YOB: 2000  ADMISSION DATE:       10/03/ appears to be in no acute distress at this time. She is A and O x3. HEENT:  Extraocular movements are intact. Pupils equal, round, and reactive to light and accommodation. Atraumatic, normocephalic. CARDIAC:  S1, S2 appreciated.   LUNGS:  Good air entr

## 2021-10-06 NOTE — PAYOR COMM NOTE
--------------  ADMISSION REVIEW     Payor: Wayne Jiménez #:  [id number]  Authorization Number: [id number]    Admit date: 10/3/21  Admit time:  4:14 PM       REVIEW DOCUMENTATION:     ED Provider Notes      ED Provid tobacco: Never Used    Vaping Use      Vaping Use: Never used    Alcohol use: No    Drug use: No      Review of Systems :  Constitutional: As per HPI    Positive for stated complaint: si  Other systems are as noted in HPI.   Constitutional and vital signs r HEPATIC FUNCTION PANEL (7) - Normal   SALICYLATE, SERUM - Normal   POCT PREGNANCY URINE - Normal   SARS-COV-2 BY PCR (GENEXPERT) - Normal   CBC WITH DIFFERENTIAL WITH PLATELET    Narrative:      The following orders were created for panel order CBC With D ATTENDING PHYSICIAN: Daksha Bourne MD   PATIENT ACCOUNT#:   [id number]    LOCATION:  [facility] [address] #:   [id number]       YOB: 2000  ADMISSION DATE:       10/03/2021    HISTORY AND PHYSICAL EXAMINATION    CHIEF COMPL SYSTEMS:  A 10-point review of systems has been obtained and otherwise negative. PHYSICAL EXAMINATION:    GENERAL:   Patient lying in bed, appears to be in no acute distress at this time. She is A and O x3.   VITAL SIGNS:  Pulse 50, respirations are continue to monitor closely. 3.   We will continue to monitor on telemetry. 4. VTE prophylaxis. Currently, we will hold pharmacologic VTE prophylaxis in the setting of aspirin overdose. 5.   Disposition.   At this time, we will continue to monitor todd HISTORY:  She is a nonsmoker. Lives with her family.     FAMILY HISTORY:  Negative for renal pathology.     REVIEW OF SYSTEMS:  At the present time, the patient feels somewhat withdrawn, but otherwise is cooperative.   No chest pain, shortness of breath, G By Isabell Andrade MD  d:     10/03/2021 15:26:55          10/4  Subjective:   Subjective:   Patient seen and examined this morning  Mother at bedside no new complaints at this time     Objective:   Blood pressure 92/64, pulse (!) 46, temperature 97.8 ° patient is currently not metabolic acidosis  -Currently on bicarb drip per nephro  -Appreciate commendations        Severe major depression, single episode, without psychotic features ([address]  -Started on Lexapro 5 mg nightly  -Abilify 1 mg nightly  -[place] [facility] Control was contacted. They recommended to monitor q.2 h. The patient was started on a bicarb drip. Over the course of the hospital stay, the patient's salicylate levels started downtrending. She was started on a diet and she did well with the diet.   Ada Bender air) —     10/04/21 1500 — 44 13 96/69 96 % — None (Room air) —     10/04/21 1400 — 55 14 87/60 97 % — None (Room air) —     10/04/21 1300 — 51 14 103/79 96 % — None (Room air) —     10/04/21 1200 97.8 °F (36.6 °C) 54 18 90/53 96 % — None (Room air

## 2021-10-13 ENCOUNTER — LAB ENCOUNTER (OUTPATIENT)
Dept: LAB | Facility: HOSPITAL | Age: 21
End: 2021-10-13
Attending: INTERNAL MEDICINE
Payer: MEDICAID

## 2021-10-13 ENCOUNTER — OFFICE VISIT (OUTPATIENT)
Dept: OBGYN CLINIC | Facility: CLINIC | Age: 21
End: 2021-10-13
Payer: MEDICAID

## 2021-10-13 VITALS
BODY MASS INDEX: 21 KG/M2 | HEART RATE: 89 BPM | SYSTOLIC BLOOD PRESSURE: 106 MMHG | DIASTOLIC BLOOD PRESSURE: 72 MMHG | WEIGHT: 112.19 LBS

## 2021-10-13 DIAGNOSIS — N91.1 SECONDARY AMENORRHEA: Primary | ICD-10-CM

## 2021-10-13 DIAGNOSIS — D50.0 IRON DEFICIENCY ANEMIA DUE TO CHRONIC BLOOD LOSS: ICD-10-CM

## 2021-10-13 PROCEDURE — 82728 ASSAY OF FERRITIN: CPT

## 2021-10-13 PROCEDURE — 3074F SYST BP LT 130 MM HG: CPT | Performed by: OBSTETRICS & GYNECOLOGY

## 2021-10-13 PROCEDURE — 82746 ASSAY OF FOLIC ACID SERUM: CPT

## 2021-10-13 PROCEDURE — 83540 ASSAY OF IRON: CPT

## 2021-10-13 PROCEDURE — 84466 ASSAY OF TRANSFERRIN: CPT

## 2021-10-13 PROCEDURE — 99203 OFFICE O/P NEW LOW 30 MIN: CPT | Performed by: OBSTETRICS & GYNECOLOGY

## 2021-10-13 PROCEDURE — 3078F DIAST BP <80 MM HG: CPT | Performed by: OBSTETRICS & GYNECOLOGY

## 2021-10-13 PROCEDURE — 85025 COMPLETE CBC W/AUTO DIFF WBC: CPT

## 2021-10-13 PROCEDURE — 82607 VITAMIN B-12: CPT

## 2021-10-13 PROCEDURE — 36415 COLL VENOUS BLD VENIPUNCTURE: CPT

## 2021-10-13 RX ORDER — ESCITALOPRAM OXALATE 10 MG/1
TABLET ORAL
COMMUNITY
Start: 2021-10-11 | End: 2022-01-06

## 2021-10-13 NOTE — H&P
HPI:  The patient is a 58-year-old female here to discuss amenorrhea. Patient states she has a history of monthly cycles with 5 days flow. She has not had her menses since May 5. Patient did receive her Covid vaccine in April.   She has not been sexually file  Physical Activity:       Days of Exercise per Week: Not on file      Minutes of Exercise per Session: Not on file  Stress:       Feeling of Stress : Not on file  Social Connections:       Frequency of Communication with Friends and Family: Not on tory visit:    Secondary amenorrhea  -     HCG, BETA SUBUNIT, QUAL  -     ASSAY, THYROID STIM HORMONE  -     PROLACTIN  -     TESTOSTERONE,FREE AND TOTAL  -     DEHYDROEPIANDROSTERONE SULFATE  -     FSH  -     ESTRADIOL          Check labs as above for secondar

## 2021-10-14 ENCOUNTER — OFFICE VISIT (OUTPATIENT)
Dept: HEMATOLOGY/ONCOLOGY | Facility: HOSPITAL | Age: 21
End: 2021-10-14
Attending: INTERNAL MEDICINE
Payer: MEDICAID

## 2021-10-14 VITALS
DIASTOLIC BLOOD PRESSURE: 65 MMHG | HEART RATE: 77 BPM | TEMPERATURE: 98 F | WEIGHT: 112 LBS | RESPIRATION RATE: 16 BRPM | HEIGHT: 60.98 IN | OXYGEN SATURATION: 100 % | BODY MASS INDEX: 21.14 KG/M2 | SYSTOLIC BLOOD PRESSURE: 102 MMHG

## 2021-10-14 DIAGNOSIS — D50.0 IRON DEFICIENCY ANEMIA DUE TO CHRONIC BLOOD LOSS: Primary | ICD-10-CM

## 2021-10-14 DIAGNOSIS — K90.9 MALABSORPTION OF IRON: ICD-10-CM

## 2021-10-14 PROCEDURE — 99214 OFFICE O/P EST MOD 30 MIN: CPT | Performed by: INTERNAL MEDICINE

## 2021-10-14 NOTE — PROGRESS NOTES
Cancer Center Progress Note    Patient Name: Rajesh Pardo   YOB: 2000   Medical Record Number: [id number]   Attending Physician: Anderson Owens M.D.      Chief Complaint:  Severe anemia    Oncology History:  24year old with severe iron defic breathing  Extremities: No edema, cyanosis, or bruising  Neurological: motor strength grossly intact, MA4E  Psych: appropriate mood and affect      Laboratory assessed and reviewed:  Lab Results   Component Value Date    GLU 85 10/04/2021    BUN 11 10/04/2

## 2021-11-14 PROBLEM — F33.2 SEVERE EPISODE OF RECURRENT MAJOR DEPRESSIVE DISORDER, WITHOUT PSYCHOTIC FEATURES (HCC): Status: ACTIVE | Noted: 2021-11-14

## 2021-11-14 NOTE — ED NOTES
Julien Crabtree that their RN is reviewing the packet and should be calling shortly for nurse report.

## 2021-11-14 NOTE — PROGRESS NOTES
11/14/21 1152   COVID Exposure Risk Screening   Have you been practicing social distancing? Yes   Have you been wearing a mask when in the community? Yes   Are the people you live with following social distancing and wearing a mask?  Yes   While you are

## 2021-11-14 NOTE — CONSULTS
[facility] HOSP - [facility]      Report of Psychiatric Consultation    Edith Kent Patient Status:  Emergency    9/3/2000 MRN [id number]   Location [address] Attending Marva Whipple MD   [facility] Day # 0 PCP Fabián Page.  Cortney Acne 08/2019   • Anemia    • Depression      Past Surgical History:   Procedure Laterality Date   • COLONOSCOPY N/A 4/19/2021    Procedure: COLONOSCOPY;  Surgeon: Claudia Cruz MD;  [facility]: [facility]     Family History   Problem Relation Age of Onset DATA  Lab Results   Component Value Date    WBC 10.4 11/14/2021    HGB 13.2 11/14/2021    HCT 40.3 11/14/2021    .0 11/14/2021    CREATSERUM 0.73 11/14/2021    BUN 10 11/14/2021     11/14/2021    K 3.4 11/14/2021     11/14/2021    CO2 26

## 2021-11-14 NOTE — PROGRESS NOTES
PSYCHIATRY      Record reviewed, discussed with staff, patient seen. 24 y.o. woman with probable  Bipolar II disorder, now depressed with suicidal ideation. Inpatient psychiatric hospitalization is indicated. Inpatient certificate completed.   Restart ho

## 2021-11-14 NOTE — ED NOTES
Call from Tracie Arshad in Intake at [address]. Jayde Lao has been accepted for admission to [facility] under the care of Dr Marylin Brown.   [facility] to leave here at 10:30 p.m. as they have other admissions going to the same unit ahead

## 2021-11-14 NOTE — ED NOTES
Spoke to Amanda whether she had a preference of Hospital.  She stated she was          Fine with returning to [facility], [facility]),  Called [facility]. Spoke to Dee. She advised that they are at their acuity for today.   The do anticipate discharg

## 2021-11-14 NOTE — ED NOTES
Emailed Petition, Certificate and Rights of Individuals receiving [address] Services to [facility] and scanned the documents into Epic.

## 2021-11-14 NOTE — ED NOTES
Patient seen for telepsychiatry. Informed Consent for Telemedicine Services scanned into patients chart.

## 2021-11-14 NOTE — ED QUICK NOTES
Patient laying on bed with eyes closed. No distress. Respirations are spontaneous/nonlabored/regular rate and rhythm. ER-T at bedside for 1:1 observation.

## 2021-11-14 NOTE — ED INITIAL ASSESSMENT (HPI)
Patient reports that she has been increasingly depressed and that she made statements to roommate tonight that \"she doesn't want to be here anymore\".  Recently dc'd from mental health facility s/p intentional overdose/SI attempt x 1 mo ago, was placed on

## 2021-11-14 NOTE — BH LEVEL OF CARE ASSESSMENT
Crisis Evaluation Assessment    Dillan Hirsch YOB: 2000   Age 24year old MRN [id number]   Location [address] Attending Alejandro Szymanski MD      Patient's legal sex: female  Patient identifies as: female  Dane been thinking about how you might kill yourself? (past 30 days): Yes  4. Have you had these thoughts and had some intention of acting on them? (past 30 days): Yes  5a.  Have you started to work out or worked out the details of how to kill yourself? (past 30 sometimes interrupted and she reports and increase in \"weird dreams\". Substance Use:  Dana reports that she typically drinks ETOH one time a week and averages 5 drinks per usage. She drank last night. BAL was 219 @ 0346.   She denies abuse of do something to you that makes you feel unsafe?: No  Have You Ever Been Harmed by a Partner/Caregiver?: No  Health Concerns r/t Abuse: No  Possible Abuse Reportable to[de-identified] Not appropriate for reporting to authorities    Mental Status Exam:   General Appearan decreased appetite. Sleep is altered. Marina Calixto started having suicidal thoughts last week. Last night the urges were stronger. She initially drank to cope with the thoughts/urges, but they worsened.   She told her roommate she was having suicidal thoughts

## 2021-11-14 NOTE — ED NOTES
[address] Spoke to [facility]. They are currently full, but may have some discharges tomorrow.

## 2021-11-14 NOTE — ED NOTES
Second certificate was emailed to [facility] at 4:13 p.m. and has been scanned into Epic.

## 2021-11-14 NOTE — CERTIFICATION
Ref: [facility] 5/3-403, 5/3-602, 5/3-607, 5/3-610    5/3-702, 5/3-813, 5/4-306, 5/4-402, 5/4-403    5/4-405, 5/4-501, 5/4-611, 8/4-336   Inpatient Certificate  Re: Edith Kent    (name)     I personally informed the above-named individual of the pur on his or her behavioral history, to suffer mental or emotional deterioration and is reasonably expected, after such deterioration, to meet the criteria of either paragraph one or paragraph two above;   []  An individual who is developmentally disabled and

## 2021-11-14 NOTE — ED NOTES
[facility]. Will check back on her for completion of assessment.

## 2021-11-14 NOTE — ED PROVIDER NOTES
Patient Seen in: [facility] Emergency Department      History   Patient presents with:  Eval-P    Stated Complaint: eval-p    Subjective:   HPI    Pt is 25 yo F who p/w depression.  Pt states she told her roommate she was having vague thoughts of h ALCOHOL - Abnormal; Notable for the following components:    Ethyl Alcohol 219 (*)     All other components within normal limits   HEPATIC FUNCTION PANEL (7) - Abnormal; Notable for the following components:    Alkaline Phosphatase 48 (*)     All other com

## 2021-11-14 NOTE — ED PROVIDER NOTES
Signout taken with disposition pending crisis evaluation for depression and questionable suicidal ideation in setting of alcohol intoxication - crisis noting prior history of suicide attempt and suicidal ideation independent of prior intoxicated state.   Meek Aquino

## 2021-11-14 NOTE — ED PROVIDER NOTES
Patient endorsed to me by Dr. Janina Kumar for continuation of care. Patient is awaiting crisis evaluation for depression and has been calm throughout my shift. Pt is in need of inpt psych transfer and is medically cleared.   Patient endorsed to Dr. Kylah koch

## 2021-11-15 NOTE — ED QUICK NOTES
Patient remains calm and cooperative. Family at bedside. Updated on the plan of care. [facility] ambulance contacted [facility] ETA 2330.

## 2022-01-06 ENCOUNTER — OFFICE VISIT (OUTPATIENT)
Dept: FAMILY MEDICINE CLINIC | Facility: CLINIC | Age: 22
End: 2022-01-06
Payer: MEDICAID

## 2022-01-06 VITALS
RESPIRATION RATE: 16 BRPM | SYSTOLIC BLOOD PRESSURE: 104 MMHG | DIASTOLIC BLOOD PRESSURE: 57 MMHG | BODY MASS INDEX: 22 KG/M2 | WEIGHT: 116 LBS | HEART RATE: 59 BPM

## 2022-01-06 DIAGNOSIS — Z30.41 ENCOUNTER FOR BIRTH CONTROL PILLS MAINTENANCE: ICD-10-CM

## 2022-01-06 DIAGNOSIS — L71.9 ROSACEA: Primary | ICD-10-CM

## 2022-01-06 LAB
CONTROL LINE PRESENT WITH A CLEAR BACKGROUND (YES/NO): YES YES/NO
PREGNANCY TEST, URINE: NEGATIVE

## 2022-01-06 PROCEDURE — 3078F DIAST BP <80 MM HG: CPT | Performed by: PHYSICIAN ASSISTANT

## 2022-01-06 PROCEDURE — 81025 URINE PREGNANCY TEST: CPT | Performed by: PHYSICIAN ASSISTANT

## 2022-01-06 PROCEDURE — 3074F SYST BP LT 130 MM HG: CPT | Performed by: PHYSICIAN ASSISTANT

## 2022-01-06 PROCEDURE — 99213 OFFICE O/P EST LOW 20 MIN: CPT | Performed by: PHYSICIAN ASSISTANT

## 2022-01-06 RX ORDER — NORETHINDRONE ACETATE AND ETHINYL ESTRADIOL 1; .02 MG/1; MG/1
1 TABLET ORAL DAILY
Qty: 28 TABLET | Refills: 11 | Status: SHIPPED | OUTPATIENT
Start: 2022-01-06 | End: 2022-02-05

## 2022-01-06 RX ORDER — METRONIDAZOLE 10 MG/G
1 GEL TOPICAL DAILY
Qty: 60 G | Refills: 5 | Status: SHIPPED | OUTPATIENT
Start: 2022-01-06

## 2022-01-07 DIAGNOSIS — Z30.41 ENCOUNTER FOR BIRTH CONTROL PILLS MAINTENANCE: ICD-10-CM

## 2022-01-07 LAB
CHLAMYDIA TRACHOMATIS$RNA, TMA: NOT DETECTED
NEISSERIA GONORRHOEAE$RNA, TMA: NOT DETECTED

## 2022-01-07 RX ORDER — METRONIDAZOLE 500 MG/1
TABLET ORAL
Qty: 14 TABLET | Refills: 0 | OUTPATIENT
Start: 2022-01-07

## 2022-01-07 NOTE — PROGRESS NOTES
HPI:     HPI  24year-old female is here in the office complaining of intermittent of rashes on both cheeks. Patient states that she was diagnosed with Rosacea but ran out of Metrogel. Patient also requests to refill BCP.   Patient denies of chest pain, SOB Substance and Sexual Activity      Alcohol use: Yes      Drug use: No      Sexual activity: Not on file    Other Topics      Concerns:        Not on file    Social History Narrative      ** Merged History Encounter **           Social Determinants of Healt Lymphadenopathy:      Cervical: No cervical adenopathy. Skin:     Findings: No rash. Neurological:      Mental Status: She is alert and oriented to person, place, and time. Psychiatric:         Behavior: Behavior is cooperative.       There are eryt

## 2022-01-09 RX ORDER — NORETHINDRONE ACETATE AND ETHINYL ESTRADIOL 1; .02 MG/1; MG/1
1 TABLET ORAL DAILY
Qty: 21 TABLET | Refills: 0 | OUTPATIENT
Start: 2022-01-09 | End: 2022-02-08

## 2022-02-07 ENCOUNTER — OFFICE VISIT (OUTPATIENT)
Dept: FAMILY MEDICINE CLINIC | Facility: CLINIC | Age: 22
End: 2022-02-07
Payer: MEDICAID

## 2022-02-07 VITALS
BODY MASS INDEX: 22.28 KG/M2 | WEIGHT: 118 LBS | HEART RATE: 99 BPM | SYSTOLIC BLOOD PRESSURE: 104 MMHG | DIASTOLIC BLOOD PRESSURE: 74 MMHG | HEIGHT: 61 IN

## 2022-02-07 DIAGNOSIS — B96.81 H PYLORI ULCER: Primary | ICD-10-CM

## 2022-02-07 DIAGNOSIS — R11.2 NAUSEA AND VOMITING, UNSPECIFIED VOMITING TYPE: ICD-10-CM

## 2022-02-07 DIAGNOSIS — K27.9 H PYLORI ULCER: Primary | ICD-10-CM

## 2022-02-07 DIAGNOSIS — R19.7 DIARRHEA, UNSPECIFIED TYPE: ICD-10-CM

## 2022-02-07 PROCEDURE — 3008F BODY MASS INDEX DOCD: CPT | Performed by: FAMILY MEDICINE

## 2022-02-07 PROCEDURE — 3074F SYST BP LT 130 MM HG: CPT | Performed by: FAMILY MEDICINE

## 2022-02-07 PROCEDURE — 3078F DIAST BP <80 MM HG: CPT | Performed by: FAMILY MEDICINE

## 2022-02-07 PROCEDURE — 90686 IIV4 VACC NO PRSV 0.5 ML IM: CPT | Performed by: FAMILY MEDICINE

## 2022-02-07 PROCEDURE — 90472 IMMUNIZATION ADMIN EACH ADD: CPT | Performed by: FAMILY MEDICINE

## 2022-02-07 PROCEDURE — 99214 OFFICE O/P EST MOD 30 MIN: CPT | Performed by: FAMILY MEDICINE

## 2022-02-07 PROCEDURE — 90651 9VHPV VACCINE 2/3 DOSE IM: CPT | Performed by: FAMILY MEDICINE

## 2022-02-07 PROCEDURE — 90471 IMMUNIZATION ADMIN: CPT | Performed by: FAMILY MEDICINE

## 2022-02-07 RX ORDER — OMEPRAZOLE 20 MG/1
20 CAPSULE, DELAYED RELEASE ORAL
Qty: 90 CAPSULE | Refills: 1 | Status: SHIPPED | OUTPATIENT
Start: 2022-02-07

## 2022-02-07 RX ORDER — ESCITALOPRAM OXALATE 10 MG/1
TABLET ORAL
COMMUNITY
Start: 2022-01-26

## 2022-02-07 NOTE — PROGRESS NOTES
Blood pressure 104/74, pulse 99, height 5' 1\" (1.549 m), weight 118 lb (53.5 kg), not currently breastfeeding. Patient presents today complaining of chronic diarrhea for the past 2 months. She also reports she has been vomiting almost daily. She has a lot of heartburn. History of H. pylori. She does not feel it ever was totally eradicated. She denies pregnancy and takes birth control. Alcohol is once a week. She does not use NSAIDs. She is on birth control denies pregnancy first day of last menstrual period was during the month of January. Denies dysuria.     Objective abdomen is soft nontender nondistended no guarding at McBurney's point no tenderness at McBurney's point negative Posey sign    Minimal tenderness left lower quadrant    Assessment #1 abdominal pain #2 chronic diarrhea #3 chronic vomiting #4 a history of H. pylori    Plan #1 blood work ordered for today #2 stool studies ordered patient was in [facility] last month #3 omeprazole prescription #4 repeat H. pylori breath test

## 2022-02-08 LAB
ABSOLUTE BASOPHILS: 27 CELLS/UL (ref 0–200)
ABSOLUTE EOSINOPHILS: 18 CELLS/UL (ref 15–500)
ABSOLUTE LYMPHOCYTES: 783 CELLS/UL (ref 850–3900)
ABSOLUTE MONOCYTES: 382 CELLS/UL (ref 200–950)
ABSOLUTE NEUTROPHILS: 7890 CELLS/UL (ref 1500–7800)
ALBUMIN/GLOBULIN RATIO: 1.6 (CALC) (ref 1–2.5)
ALBUMIN: 4.1 G/DL (ref 3.6–5.1)
ALKALINE PHOSPHATASE: 49 U/L (ref 31–125)
ALT: 13 U/L (ref 6–29)
AST: 16 U/L (ref 10–30)
BASOPHILS: 0.3 %
BILIRUBIN, DIRECT: 0.1 MG/DL
BILIRUBIN, INDIRECT: 0.5 MG/DL (CALC) (ref 0.2–1.2)
BILIRUBIN, TOTAL: 0.6 MG/DL (ref 0.2–1.2)
BUN: 16 MG/DL (ref 7–25)
CALCIUM: 9.2 MG/DL (ref 8.6–10.2)
CARBON DIOXIDE: 24 MMOL/L (ref 20–32)
CHLORIDE: 105 MMOL/L (ref 98–110)
CREATINE KINASE, TOTAL: 98 U/L (ref 29–143)
CREATININE: 0.76 MG/DL (ref 0.5–1.1)
EGFR IF AFRICN AM: 129 ML/MIN/1.73M2
EGFR IF NONAFRICN AM: 111 ML/MIN/1.73M2
EOSINOPHILS: 0.2 %
GLOBULIN: 2.5 G/DL (CALC) (ref 1.9–3.7)
GLUCOSE: 92 MG/DL (ref 65–139)
HCG, TOTAL, QL: NEGATIVE
HEMATOCRIT: 42.4 % (ref 35–45)
HEMOGLOBIN: 14 G/DL (ref 11.7–15.5)
LIPASE: 20 U/L (ref 7–60)
LYMPHOCYTES: 8.6 %
MCH: 29.4 PG (ref 27–33)
MCHC: 33 G/DL (ref 32–36)
MCV: 88.9 FL (ref 80–100)
MONOCYTES: 4.2 %
MPV: 12.3 FL (ref 7.5–12.5)
PLATELET COUNT: 242 THOUSAND/UL (ref 140–400)
POTASSIUM: 4 MMOL/L (ref 3.5–5.3)
PROTEIN, TOTAL: 6.6 G/DL (ref 6.1–8.1)
RDW: 12.2 % (ref 11–15)
RESULT:: NOT DETECTED
SODIUM: 138 MMOL/L (ref 135–146)
WHITE BLOOD CELL COUNT: 9.1 THOUSAND/UL (ref 3.8–10.8)

## 2022-03-17 ENCOUNTER — HOSPITAL ENCOUNTER (OUTPATIENT)
Dept: ULTRASOUND IMAGING | Age: 22
Discharge: HOME | End: 2022-03-17
Attending: FAMILY MEDICINE
Payer: MEDICAID

## 2022-03-17 DIAGNOSIS — R11.2 NAUSEA AND VOMITING, UNSPECIFIED VOMITING TYPE: ICD-10-CM

## 2022-03-17 PROCEDURE — 76705 ECHO EXAM OF ABDOMEN: CPT | Performed by: FAMILY MEDICINE

## 2022-04-14 ENCOUNTER — APPOINTMENT (OUTPATIENT)
Dept: HEMATOLOGY/ONCOLOGY | Facility: HOSPITAL | Age: 22
End: 2022-04-14
Attending: INTERNAL MEDICINE
Payer: MEDICAID

## 2022-04-14 ENCOUNTER — OFFICE VISIT (OUTPATIENT)
Dept: FAMILY MEDICINE CLINIC | Facility: CLINIC | Age: 22
End: 2022-04-14
Payer: MEDICAID

## 2022-04-14 VITALS
HEIGHT: 61 IN | SYSTOLIC BLOOD PRESSURE: 98 MMHG | HEART RATE: 69 BPM | WEIGHT: 121 LBS | DIASTOLIC BLOOD PRESSURE: 64 MMHG | BODY MASS INDEX: 22.84 KG/M2

## 2022-04-14 DIAGNOSIS — K59.00 CONSTIPATION, UNSPECIFIED CONSTIPATION TYPE: ICD-10-CM

## 2022-04-14 DIAGNOSIS — L71.9 ROSACEA: ICD-10-CM

## 2022-04-14 DIAGNOSIS — N92.6 IRREGULAR MENSES: Primary | ICD-10-CM

## 2022-04-14 LAB
APPEARANCE: CLEAR
BILIRUBIN: NEGATIVE
CONTROL LINE PRESENT WITH A CLEAR BACKGROUND (YES/NO): YES YES/NO
GLUCOSE (URINE DIPSTICK): NEGATIVE MG/DL
KETONES (URINE DIPSTICK): NEGATIVE MG/DL
LEUKOCYTES: NEGATIVE
MULTISTIX LOT#: NORMAL NUMERIC
NITRITE, URINE: NEGATIVE
OCCULT BLOOD: NEGATIVE
PH, URINE: 7.5 (ref 4.5–8)
PREGNANCY TEST, URINE: NEGATIVE
PROTEIN (URINE DIPSTICK): NEGATIVE MG/DL
SPECIFIC GRAVITY: 1.02 (ref 1–1.03)
URINE-COLOR: YELLOW
UROBILINOGEN,SEMI-QN: 0.2 MG/DL (ref 0–1.9)

## 2022-04-14 PROCEDURE — 3008F BODY MASS INDEX DOCD: CPT | Performed by: PHYSICIAN ASSISTANT

## 2022-04-14 PROCEDURE — 99214 OFFICE O/P EST MOD 30 MIN: CPT | Performed by: PHYSICIAN ASSISTANT

## 2022-04-14 PROCEDURE — 3074F SYST BP LT 130 MM HG: CPT | Performed by: PHYSICIAN ASSISTANT

## 2022-04-14 PROCEDURE — 81002 URINALYSIS NONAUTO W/O SCOPE: CPT | Performed by: PHYSICIAN ASSISTANT

## 2022-04-14 PROCEDURE — 3078F DIAST BP <80 MM HG: CPT | Performed by: PHYSICIAN ASSISTANT

## 2022-04-14 PROCEDURE — 81025 URINE PREGNANCY TEST: CPT | Performed by: PHYSICIAN ASSISTANT

## 2022-04-14 RX ORDER — NORGESTIMATE AND ETHINYL ESTRADIOL 7DAYSX3 28
1 KIT ORAL DAILY
Qty: 28 TABLET | Refills: 12 | Status: SHIPPED | OUTPATIENT
Start: 2022-04-14 | End: 2023-04-14

## 2022-04-14 RX ORDER — DOXYCYCLINE HYCLATE 100 MG
100 TABLET ORAL 2 TIMES DAILY
Qty: 60 TABLET | Refills: 0 | Status: SHIPPED | OUTPATIENT
Start: 2022-04-14 | End: 2022-05-14

## 2022-04-18 ENCOUNTER — LAB ENCOUNTER (OUTPATIENT)
Dept: LAB | Facility: HOSPITAL | Age: 22
End: 2022-04-18
Attending: FAMILY MEDICINE
Payer: MEDICAID

## 2022-04-18 DIAGNOSIS — D50.0 IRON DEFICIENCY ANEMIA DUE TO CHRONIC BLOOD LOSS: ICD-10-CM

## 2022-04-18 LAB
BASOPHILS # BLD AUTO: 0.07 X10(3) UL (ref 0–0.2)
BASOPHILS NFR BLD AUTO: 1 %
DEPRECATED HBV CORE AB SER IA-ACNC: 156.1 NG/ML
DEPRECATED RDW RBC AUTO: 44.3 FL (ref 35.1–46.3)
EOSINOPHIL # BLD AUTO: 0.07 X10(3) UL (ref 0–0.7)
EOSINOPHIL NFR BLD AUTO: 1 %
ERYTHROCYTE [DISTWIDTH] IN BLOOD BY AUTOMATED COUNT: 13.2 % (ref 11–15)
HCT VFR BLD AUTO: 42.3 %
HGB BLD-MCNC: 13.7 G/DL
IMM GRANULOCYTES # BLD AUTO: 0.02 X10(3) UL (ref 0–1)
IMM GRANULOCYTES NFR BLD: 0.3 %
IRON SATN MFR SERPL: 26 %
IRON SERPL-MCNC: 91 UG/DL
LYMPHOCYTES # BLD AUTO: 2.12 X10(3) UL (ref 1–4)
LYMPHOCYTES NFR BLD AUTO: 29.2 %
MCH RBC QN AUTO: 29.8 PG (ref 26–34)
MCHC RBC AUTO-ENTMCNC: 32.4 G/DL (ref 31–37)
MCV RBC AUTO: 92 FL
MONOCYTES # BLD AUTO: 0.52 X10(3) UL (ref 0.1–1)
MONOCYTES NFR BLD AUTO: 7.2 %
NEUTROPHILS # BLD AUTO: 4.46 X10 (3) UL (ref 1.5–7.7)
NEUTROPHILS # BLD AUTO: 4.46 X10(3) UL (ref 1.5–7.7)
NEUTROPHILS NFR BLD AUTO: 61.3 %
PLATELET # BLD AUTO: 206 10(3)UL (ref 150–450)
RBC # BLD AUTO: 4.6 X10(6)UL
TIBC SERPL-MCNC: 344 UG/DL (ref 240–450)
TRANSFERRIN SERPL-MCNC: 231 MG/DL (ref 200–360)
WBC # BLD AUTO: 7.3 X10(3) UL (ref 4–11)

## 2022-04-18 PROCEDURE — 82728 ASSAY OF FERRITIN: CPT

## 2022-04-18 PROCEDURE — 36415 COLL VENOUS BLD VENIPUNCTURE: CPT

## 2022-04-18 PROCEDURE — 84466 ASSAY OF TRANSFERRIN: CPT

## 2022-04-18 PROCEDURE — 85025 COMPLETE CBC W/AUTO DIFF WBC: CPT

## 2022-04-18 PROCEDURE — 83540 ASSAY OF IRON: CPT

## 2022-04-19 ENCOUNTER — OFFICE VISIT (OUTPATIENT)
Dept: HEMATOLOGY/ONCOLOGY | Facility: HOSPITAL | Age: 22
End: 2022-04-19
Attending: INTERNAL MEDICINE
Payer: MEDICAID

## 2022-04-19 VITALS
DIASTOLIC BLOOD PRESSURE: 55 MMHG | WEIGHT: 125 LBS | TEMPERATURE: 98 F | HEART RATE: 54 BPM | RESPIRATION RATE: 16 BRPM | HEIGHT: 61 IN | SYSTOLIC BLOOD PRESSURE: 101 MMHG | OXYGEN SATURATION: 98 % | BODY MASS INDEX: 23.6 KG/M2

## 2022-04-19 DIAGNOSIS — D50.0 IRON DEFICIENCY ANEMIA DUE TO CHRONIC BLOOD LOSS: Primary | ICD-10-CM

## 2022-04-19 DIAGNOSIS — K90.9 MALABSORPTION OF IRON: ICD-10-CM

## 2022-04-19 DIAGNOSIS — R71.8 MICROCYTOSIS: ICD-10-CM

## 2022-04-19 PROBLEM — T50.902A SUICIDAL OVERDOSE, INITIAL ENCOUNTER (HCC): Status: RESOLVED | Noted: 2021-10-03 | Resolved: 2022-04-19

## 2022-04-19 PROCEDURE — 99213 OFFICE O/P EST LOW 20 MIN: CPT | Performed by: INTERNAL MEDICINE

## 2022-04-25 ENCOUNTER — OFFICE VISIT (OUTPATIENT)
Dept: FAMILY MEDICINE CLINIC | Facility: CLINIC | Age: 22
End: 2022-04-25
Payer: MEDICAID

## 2022-04-25 VITALS
BODY MASS INDEX: 22.66 KG/M2 | WEIGHT: 120 LBS | HEART RATE: 82 BPM | RESPIRATION RATE: 16 BRPM | SYSTOLIC BLOOD PRESSURE: 110 MMHG | TEMPERATURE: 99 F | HEIGHT: 61 IN | DIASTOLIC BLOOD PRESSURE: 70 MMHG

## 2022-04-25 DIAGNOSIS — R22.1 SENSATION OF LUMP IN THROAT: Primary | ICD-10-CM

## 2022-04-25 LAB
CONTROL LINE PRESENT WITH A CLEAR BACKGROUND (YES/NO): YES YES/NO
KIT LOT #: NORMAL NUMERIC
STREP GRP A CUL-SCR: NEGATIVE

## 2022-04-25 RX ORDER — NICOTINE POLACRILEX 4 MG/1
1 GUM, CHEWING ORAL DAILY
Qty: 30 TABLET | Refills: 0 | Status: SHIPPED | OUTPATIENT
Start: 2022-04-25 | End: 2022-05-25

## 2022-04-27 ENCOUNTER — HOSPITAL ENCOUNTER (EMERGENCY)
Facility: HOSPITAL | Age: 22
Discharge: HOME | End: 2022-04-27
Attending: EMERGENCY MEDICINE
Payer: MEDICAID

## 2022-04-27 ENCOUNTER — APPOINTMENT (OUTPATIENT)
Dept: GENERAL RADIOLOGY | Facility: HOSPITAL | Age: 22
End: 2022-04-27
Attending: EMERGENCY MEDICINE
Payer: MEDICAID

## 2022-04-27 VITALS
HEART RATE: 68 BPM | SYSTOLIC BLOOD PRESSURE: 120 MMHG | DIASTOLIC BLOOD PRESSURE: 82 MMHG | WEIGHT: 120 LBS | RESPIRATION RATE: 18 BRPM | BODY MASS INDEX: 22.66 KG/M2 | TEMPERATURE: 98 F | OXYGEN SATURATION: 99 % | HEIGHT: 61 IN

## 2022-04-27 DIAGNOSIS — R09.89 GLOBUS SENSATION: ICD-10-CM

## 2022-04-27 DIAGNOSIS — K21.9 GASTROESOPHAGEAL REFLUX DISEASE, UNSPECIFIED WHETHER ESOPHAGITIS PRESENT: Primary | ICD-10-CM

## 2022-04-27 LAB
ALBUMIN SERPL-MCNC: 3.1 G/DL (ref 3.4–5)
ALP LIVER SERPL-CCNC: 55 U/L
ALT SERPL-CCNC: 19 U/L
ANION GAP SERPL CALC-SCNC: 6 MMOL/L (ref 0–18)
AST SERPL-CCNC: 17 U/L (ref 15–37)
BASOPHILS # BLD AUTO: 0.06 X10(3) UL (ref 0–0.2)
BASOPHILS NFR BLD AUTO: 0.7 %
BILIRUB DIRECT SERPL-MCNC: <0.1 MG/DL (ref 0–0.2)
BILIRUB SERPL-MCNC: 0.3 MG/DL (ref 0.1–2)
BUN BLD-MCNC: 14 MG/DL (ref 7–18)
BUN/CREAT SERPL: 16.5 (ref 10–20)
CALCIUM BLD-MCNC: 8.3 MG/DL (ref 8.5–10.1)
CHLORIDE SERPL-SCNC: 107 MMOL/L (ref 98–112)
CO2 SERPL-SCNC: 30 MMOL/L (ref 21–32)
CREAT BLD-MCNC: 0.85 MG/DL
DEPRECATED RDW RBC AUTO: 45 FL (ref 35.1–46.3)
EOSINOPHIL # BLD AUTO: 0.1 X10(3) UL (ref 0–0.7)
EOSINOPHIL NFR BLD AUTO: 1.2 %
ERYTHROCYTE [DISTWIDTH] IN BLOOD BY AUTOMATED COUNT: 13.2 % (ref 11–15)
GLUCOSE BLD-MCNC: 90 MG/DL (ref 70–99)
HCT VFR BLD AUTO: 40.8 %
HGB BLD-MCNC: 13.1 G/DL
IMM GRANULOCYTES # BLD AUTO: 0.02 X10(3) UL (ref 0–1)
IMM GRANULOCYTES NFR BLD: 0.2 %
LIPASE SERPL-CCNC: 108 U/L (ref 73–393)
LYMPHOCYTES # BLD AUTO: 1.8 X10(3) UL (ref 1–4)
LYMPHOCYTES NFR BLD AUTO: 21.8 %
MCH RBC QN AUTO: 29.7 PG (ref 26–34)
MCHC RBC AUTO-ENTMCNC: 32.1 G/DL (ref 31–37)
MCV RBC AUTO: 92.5 FL
MONOCYTES # BLD AUTO: 0.75 X10(3) UL (ref 0.1–1)
MONOCYTES NFR BLD AUTO: 9.1 %
NEUTROPHILS # BLD AUTO: 5.54 X10 (3) UL (ref 1.5–7.7)
NEUTROPHILS # BLD AUTO: 5.54 X10(3) UL (ref 1.5–7.7)
NEUTROPHILS NFR BLD AUTO: 67 %
OSMOLALITY SERPL CALC.SUM OF ELEC: 296 MOSM/KG (ref 275–295)
PLATELET # BLD AUTO: 162 10(3)UL (ref 150–450)
POTASSIUM SERPL-SCNC: 3.9 MMOL/L (ref 3.5–5.1)
PROT SERPL-MCNC: 6.5 G/DL (ref 6.4–8.2)
RBC # BLD AUTO: 4.41 X10(6)UL
SODIUM SERPL-SCNC: 143 MMOL/L (ref 136–145)
TROPONIN I HIGH SENSITIVITY: 3 NG/L
WBC # BLD AUTO: 8.3 X10(3) UL (ref 4–11)

## 2022-04-27 PROCEDURE — S0028 INJECTION, FAMOTIDINE, 20 MG: HCPCS | Performed by: EMERGENCY MEDICINE

## 2022-04-27 PROCEDURE — 93010 ELECTROCARDIOGRAM REPORT: CPT | Performed by: EMERGENCY MEDICINE

## 2022-04-27 PROCEDURE — 93005 ELECTROCARDIOGRAM TRACING: CPT

## 2022-04-27 PROCEDURE — 99284 EMERGENCY DEPT VISIT MOD MDM: CPT

## 2022-04-27 PROCEDURE — 80076 HEPATIC FUNCTION PANEL: CPT | Performed by: EMERGENCY MEDICINE

## 2022-04-27 PROCEDURE — 85025 COMPLETE CBC W/AUTO DIFF WBC: CPT | Performed by: EMERGENCY MEDICINE

## 2022-04-27 PROCEDURE — 84484 ASSAY OF TROPONIN QUANT: CPT | Performed by: EMERGENCY MEDICINE

## 2022-04-27 PROCEDURE — 83690 ASSAY OF LIPASE: CPT | Performed by: EMERGENCY MEDICINE

## 2022-04-27 PROCEDURE — 80048 BASIC METABOLIC PNL TOTAL CA: CPT | Performed by: EMERGENCY MEDICINE

## 2022-04-27 PROCEDURE — 71045 X-RAY EXAM CHEST 1 VIEW: CPT | Performed by: EMERGENCY MEDICINE

## 2022-04-27 PROCEDURE — 36415 COLL VENOUS BLD VENIPUNCTURE: CPT

## 2022-04-27 RX ORDER — FAMOTIDINE 10 MG/ML
20 INJECTION, SOLUTION INTRAVENOUS ONCE
Status: DISCONTINUED | OUTPATIENT
Start: 2022-04-27 | End: 2022-04-27

## 2022-04-27 RX ORDER — SUCRALFATE ORAL 1 G/10ML
1 SUSPENSION ORAL
Qty: 1 EACH | Refills: 0 | Status: SHIPPED | OUTPATIENT
Start: 2022-04-27

## 2022-04-27 RX ORDER — FAMOTIDINE 20 MG/1
20 TABLET, FILM COATED ORAL ONCE
Status: COMPLETED | OUTPATIENT
Start: 2022-04-27 | End: 2022-04-27

## 2022-04-27 RX ORDER — FAMOTIDINE 20 MG/1
TABLET, FILM COATED ORAL
Status: COMPLETED
Start: 2022-04-27 | End: 2022-04-27

## 2022-04-27 NOTE — ED INITIAL ASSESSMENT (HPI)
Patient to ER from home with c/o foreign body sensation to throat and \"acid reflux\" ongoing X4 days. Patient speaking in full sentences with regular non-labored breathing.  Tolerating oral secretions,

## 2022-05-02 ENCOUNTER — HOSPITAL ENCOUNTER (OUTPATIENT)
Age: 22
Discharge: HOME | End: 2022-05-02
Payer: MEDICAID

## 2022-05-02 VITALS
HEIGHT: 61 IN | WEIGHT: 120 LBS | OXYGEN SATURATION: 100 % | TEMPERATURE: 98 F | BODY MASS INDEX: 22.66 KG/M2 | HEART RATE: 66 BPM | SYSTOLIC BLOOD PRESSURE: 113 MMHG | RESPIRATION RATE: 18 BRPM | DIASTOLIC BLOOD PRESSURE: 71 MMHG

## 2022-05-02 DIAGNOSIS — N89.8 VAGINAL DISCHARGE: Primary | ICD-10-CM

## 2022-05-02 LAB
B-HCG UR QL: NEGATIVE
BILIRUB UR QL STRIP: NEGATIVE
CLARITY UR: CLEAR
COLOR UR: YELLOW
GLUCOSE UR STRIP-MCNC: NEGATIVE MG/DL
HGB UR QL STRIP: NEGATIVE
KETONES UR STRIP-MCNC: NEGATIVE MG/DL
LEUKOCYTE ESTERASE UR QL STRIP: NEGATIVE
NITRITE UR QL STRIP: NEGATIVE
PH UR STRIP: 6 [PH]
PROT UR STRIP-MCNC: NEGATIVE MG/DL
SP GR UR STRIP: 1.01
UROBILINOGEN UR STRIP-ACNC: <2 MG/DL

## 2022-05-02 PROCEDURE — 81025 URINE PREGNANCY TEST: CPT | Performed by: NURSE PRACTITIONER

## 2022-05-02 PROCEDURE — 81002 URINALYSIS NONAUTO W/O SCOPE: CPT | Performed by: NURSE PRACTITIONER

## 2022-05-02 PROCEDURE — 99213 OFFICE O/P EST LOW 20 MIN: CPT | Performed by: NURSE PRACTITIONER

## 2022-05-02 RX ORDER — FLUCONAZOLE 150 MG/1
150 TABLET ORAL ONCE
Qty: 2 TABLET | Refills: 0 | Status: SHIPPED | OUTPATIENT
Start: 2022-05-02 | End: 2022-05-02

## 2022-05-02 NOTE — ED INITIAL ASSESSMENT (HPI)
Pt presents with vaginal itch and vaginal bleeding starting 3 days ago. Pt believes it is most likely her period. Pt reports not having a period due to continuous BCP since last summer 2021. Thick white vaginal discharge. Pt changed BCP 3 weeks ago. Pt reports \"sex is also painful\". Pain to low abdomen during sex.

## 2022-05-03 LAB
C TRACH DNA SPEC QL NAA+PROBE: NEGATIVE
N GONORRHOEA DNA SPEC QL NAA+PROBE: NEGATIVE

## 2022-05-04 LAB
GENITAL VAGINOSIS SCREEN: NEGATIVE
TRICHOMONAS SCREEN: NEGATIVE

## 2022-05-31 ENCOUNTER — OFFICE VISIT (OUTPATIENT)
Dept: FAMILY MEDICINE CLINIC | Facility: CLINIC | Age: 22
End: 2022-05-31
Payer: MEDICAID

## 2022-05-31 VITALS
HEIGHT: 61 IN | DIASTOLIC BLOOD PRESSURE: 65 MMHG | SYSTOLIC BLOOD PRESSURE: 100 MMHG | WEIGHT: 122 LBS | HEART RATE: 75 BPM | BODY MASS INDEX: 23.03 KG/M2

## 2022-05-31 DIAGNOSIS — N39.41 URGE INCONTINENCE: ICD-10-CM

## 2022-05-31 DIAGNOSIS — L71.9 ROSACEA: Primary | ICD-10-CM

## 2022-05-31 LAB
APPEARANCE: CLEAR
BILIRUBIN: NEGATIVE
GLUCOSE (URINE DIPSTICK): NEGATIVE MG/DL
KETONES (URINE DIPSTICK): NEGATIVE MG/DL
LEUKOCYTES: NEGATIVE
MULTISTIX LOT#: ABNORMAL NUMERIC
NITRITE, URINE: NEGATIVE
PH, URINE: 7 (ref 4.5–8)
PROTEIN (URINE DIPSTICK): NEGATIVE MG/DL
SPECIFIC GRAVITY: 1.01 (ref 1–1.03)
URINE-COLOR: YELLOW
UROBILINOGEN,SEMI-QN: 0.2 MG/DL (ref 0–1.9)

## 2022-05-31 PROCEDURE — 3008F BODY MASS INDEX DOCD: CPT | Performed by: PHYSICIAN ASSISTANT

## 2022-05-31 PROCEDURE — 3078F DIAST BP <80 MM HG: CPT | Performed by: PHYSICIAN ASSISTANT

## 2022-05-31 PROCEDURE — 81002 URINALYSIS NONAUTO W/O SCOPE: CPT | Performed by: PHYSICIAN ASSISTANT

## 2022-05-31 PROCEDURE — 99213 OFFICE O/P EST LOW 20 MIN: CPT | Performed by: PHYSICIAN ASSISTANT

## 2022-05-31 PROCEDURE — 3074F SYST BP LT 130 MM HG: CPT | Performed by: PHYSICIAN ASSISTANT

## 2022-05-31 RX ORDER — OMEPRAZOLE 20 MG/1
CAPSULE, DELAYED RELEASE ORAL
COMMUNITY
Start: 2022-04-25

## 2022-05-31 RX ORDER — DOXYCYCLINE HYCLATE 100 MG
100 TABLET ORAL 2 TIMES DAILY
Qty: 60 TABLET | Refills: 1 | Status: SHIPPED | OUTPATIENT
Start: 2022-05-31 | End: 2022-06-30

## 2022-05-31 RX ORDER — FLUCONAZOLE 150 MG/1
TABLET ORAL
COMMUNITY
Start: 2022-05-02

## 2022-07-07 ENCOUNTER — OFFICE VISIT (OUTPATIENT)
Dept: SURGERY | Facility: CLINIC | Age: 22
End: 2022-07-07
Payer: MEDICAID

## 2022-07-07 VITALS — HEART RATE: 71 BPM | SYSTOLIC BLOOD PRESSURE: 99 MMHG | DIASTOLIC BLOOD PRESSURE: 67 MMHG

## 2022-07-07 DIAGNOSIS — R39.15 URINARY URGENCY: ICD-10-CM

## 2022-07-07 DIAGNOSIS — N39.41 URGE INCONTINENCE: Primary | ICD-10-CM

## 2022-07-07 LAB
BILIRUB UR QL: NEGATIVE
CLARITY UR: CLEAR
COLOR UR: YELLOW
GLUCOSE UR-MCNC: NEGATIVE MG/DL
HGB UR QL STRIP.AUTO: NEGATIVE
KETONES UR-MCNC: NEGATIVE MG/DL
LEUKOCYTE ESTERASE UR QL STRIP.AUTO: NEGATIVE
NITRITE UR QL STRIP.AUTO: NEGATIVE
PH UR: 6 [PH] (ref 5–8)
PROT UR-MCNC: NEGATIVE MG/DL
SP GR UR STRIP: 1.01 (ref 1–1.03)
UROBILINOGEN UR STRIP-ACNC: 0.2

## 2022-07-07 PROCEDURE — 3074F SYST BP LT 130 MM HG: CPT | Performed by: NURSE PRACTITIONER

## 2022-07-07 PROCEDURE — 3078F DIAST BP <80 MM HG: CPT | Performed by: NURSE PRACTITIONER

## 2022-07-07 PROCEDURE — 99244 OFF/OP CNSLTJ NEW/EST MOD 40: CPT | Performed by: NURSE PRACTITIONER

## 2022-08-12 ENCOUNTER — HOSPITAL ENCOUNTER (OUTPATIENT)
Age: 22
Discharge: HOME | End: 2022-08-12
Payer: MEDICAID

## 2022-08-12 VITALS
SYSTOLIC BLOOD PRESSURE: 102 MMHG | TEMPERATURE: 99 F | DIASTOLIC BLOOD PRESSURE: 72 MMHG | OXYGEN SATURATION: 100 % | RESPIRATION RATE: 18 BRPM | HEART RATE: 84 BPM

## 2022-08-12 DIAGNOSIS — J06.9 VIRAL UPPER RESPIRATORY TRACT INFECTION: ICD-10-CM

## 2022-08-12 DIAGNOSIS — Z20.822 ENCOUNTER FOR LABORATORY TESTING FOR COVID-19 VIRUS: Primary | ICD-10-CM

## 2022-08-12 LAB — SARS-COV-2 RNA RESP QL NAA+PROBE: NOT DETECTED

## 2022-08-12 PROCEDURE — 99213 OFFICE O/P EST LOW 20 MIN: CPT

## 2022-08-12 PROCEDURE — U0002 COVID-19 LAB TEST NON-CDC: HCPCS

## 2022-08-12 NOTE — ED INITIAL ASSESSMENT (HPI)
Patient presents a&o 4/4 with complaints of sore throat, HA, body aches, chills, subjective fever Wednesday.  States took at-home covid test and came back negative

## 2022-08-25 ENCOUNTER — TELEPHONE (OUTPATIENT)
Dept: DERMATOLOGY CLINIC | Facility: CLINIC | Age: 22
End: 2022-08-25

## 2022-08-25 ENCOUNTER — OFFICE VISIT (OUTPATIENT)
Dept: DERMATOLOGY CLINIC | Facility: CLINIC | Age: 22
End: 2022-08-25
Payer: MEDICAID

## 2022-08-25 DIAGNOSIS — L71.9 ROSACEA: ICD-10-CM

## 2022-08-25 DIAGNOSIS — L70.0 ACNE VULGARIS: Primary | ICD-10-CM

## 2022-08-25 PROCEDURE — 99203 OFFICE O/P NEW LOW 30 MIN: CPT | Performed by: DERMATOLOGY

## 2022-08-25 RX ORDER — TRETINOIN 0.5 MG/G
CREAM TOPICAL
Qty: 20 G | Refills: 3 | Status: SHIPPED | OUTPATIENT
Start: 2022-08-25

## 2022-08-25 RX ORDER — AZELAIC ACID 0.15 G/G
GEL TOPICAL
Qty: 50 G | Refills: 2 | Status: SHIPPED | OUTPATIENT
Start: 2022-08-25

## 2022-08-25 RX ORDER — MINOCYCLINE HYDROCHLORIDE 50 MG/1
50 CAPSULE ORAL DAILY
Qty: 30 CAPSULE | Refills: 3 | Status: SHIPPED | OUTPATIENT
Start: 2022-08-25

## 2022-08-29 NOTE — TELEPHONE ENCOUNTER
Medication PA Requested: Azelaic Acid (FINACEA) 15% External Gel                                                         CoverMyMeds Used:  Key:  Quantity: 50g  Day Supply:  Sig: Use bid as directed to face  DX Code:        Rosacea                             CPT code (if applicable):   Case Number/Pending Ref#:     Thank you!

## 2022-08-31 NOTE — TELEPHONE ENCOUNTER
Await OV note 8/25 to submit with PA    Medication PA Requested: Azelaic Acid (FINACEA) 15% External Gel                                                         CoverMyMeds Used:  Key:  Quantity: 50g  Day Supply:  Sig: Use bid as directed to face  DX Code:        Rosacea         9/6/2022 8:11 faxed  [employer] PA form with OV note 8/25/2022. Awaiting determination.

## 2022-09-22 ENCOUNTER — OFFICE VISIT (OUTPATIENT)
Dept: FAMILY MEDICINE CLINIC | Facility: CLINIC | Age: 22
End: 2022-09-22

## 2022-09-22 VITALS
HEART RATE: 65 BPM | DIASTOLIC BLOOD PRESSURE: 65 MMHG | WEIGHT: 121 LBS | HEIGHT: 61 IN | BODY MASS INDEX: 22.84 KG/M2 | SYSTOLIC BLOOD PRESSURE: 102 MMHG

## 2022-09-22 DIAGNOSIS — D64.9 ANEMIA, UNSPECIFIED TYPE: ICD-10-CM

## 2022-09-22 DIAGNOSIS — D64.9 FATIGUE ASSOCIATED WITH ANEMIA: Primary | ICD-10-CM

## 2022-09-22 PROCEDURE — 3074F SYST BP LT 130 MM HG: CPT | Performed by: PHYSICIAN ASSISTANT

## 2022-09-22 PROCEDURE — 3078F DIAST BP <80 MM HG: CPT | Performed by: PHYSICIAN ASSISTANT

## 2022-09-22 PROCEDURE — 3008F BODY MASS INDEX DOCD: CPT | Performed by: PHYSICIAN ASSISTANT

## 2022-09-22 PROCEDURE — 99213 OFFICE O/P EST LOW 20 MIN: CPT | Performed by: PHYSICIAN ASSISTANT

## 2022-09-23 LAB
% SATURATION: 29 % (CALC) (ref 16–45)
ABSOLUTE BASOPHILS: 49 CELLS/UL (ref 0–200)
ABSOLUTE EOSINOPHILS: 49 CELLS/UL (ref 15–500)
ABSOLUTE LYMPHOCYTES: 1652 CELLS/UL (ref 850–3900)
ABSOLUTE MONOCYTES: 373 CELLS/UL (ref 200–950)
ABSOLUTE NEUTROPHILS: 3278 CELLS/UL (ref 1500–7800)
BASOPHILS: 0.9 %
EOSINOPHILS: 0.9 %
FERRITIN: 133 NG/ML (ref 16–154)
HEMATOCRIT: 42.3 % (ref 35–45)
HEMOGLOBIN: 14 G/DL (ref 11.7–15.5)
IRON BINDING CAPACITY: 319 MCG/DL (CALC) (ref 250–450)
IRON, TOTAL: 92 MCG/DL (ref 40–190)
LYMPHOCYTES: 30.6 %
MCH: 28.8 PG (ref 27–33)
MCHC: 33.1 G/DL (ref 32–36)
MCV: 87 FL (ref 80–100)
MONOCYTES: 6.9 %
MPV: 15.1 FL (ref 7.5–12.5)
NEUTROPHILS: 60.7 %
PLATELET COUNT: 183 THOUSAND/UL (ref 140–400)
RDW: 12.3 % (ref 11–15)
RED BLOOD CELL COUNT: 4.86 MILLION/UL (ref 3.8–5.1)
WHITE BLOOD CELL COUNT: 5.4 THOUSAND/UL (ref 3.8–10.8)

## 2023-01-09 ENCOUNTER — PATIENT MESSAGE (OUTPATIENT)
Dept: GASTROENTEROLOGY | Facility: CLINIC | Age: 23
End: 2023-01-09

## 2023-01-09 ENCOUNTER — APPOINTMENT (OUTPATIENT)
Dept: CT IMAGING | Age: 23
End: 2023-01-09
Attending: NURSE PRACTITIONER
Payer: MEDICAID

## 2023-01-09 ENCOUNTER — HOSPITAL ENCOUNTER (OUTPATIENT)
Age: 23
Discharge: HOME | End: 2023-01-09
Payer: MEDICAID

## 2023-01-09 VITALS
TEMPERATURE: 99 F | OXYGEN SATURATION: 99 % | RESPIRATION RATE: 18 BRPM | HEART RATE: 91 BPM | DIASTOLIC BLOOD PRESSURE: 79 MMHG | SYSTOLIC BLOOD PRESSURE: 111 MMHG

## 2023-01-09 DIAGNOSIS — R10.32 LLQ ABDOMINAL PAIN: Primary | ICD-10-CM

## 2023-01-09 DIAGNOSIS — K59.00 CONSTIPATION, UNSPECIFIED CONSTIPATION TYPE: ICD-10-CM

## 2023-01-09 DIAGNOSIS — K52.9 ENTERITIS: ICD-10-CM

## 2023-01-09 LAB
#MXD IC: 0.5 X10ˆ3/UL (ref 0.1–1)
B-HCG UR QL: NEGATIVE
BILIRUB UR QL STRIP: NEGATIVE
BUN BLD-MCNC: 15 MG/DL (ref 7–18)
CHLORIDE BLD-SCNC: 103 MMOL/L (ref 98–112)
CLARITY UR: CLEAR
CO2 BLD-SCNC: 27 MMOL/L (ref 21–32)
COLOR UR: YELLOW
CREAT BLD-MCNC: 0.6 MG/DL
GFR SERPLBLD BASED ON 1.73 SQ M-ARVRAT: 129 ML/MIN/1.73M2 (ref 60–?)
GLUCOSE BLD-MCNC: 93 MG/DL (ref 70–99)
GLUCOSE UR STRIP-MCNC: NEGATIVE MG/DL
HCT VFR BLD AUTO: 43.5 %
HCT VFR BLD CALC: 49 %
HGB BLD-MCNC: 14.2 G/DL
HGB UR QL STRIP: NEGATIVE
ISTAT IONIZED CALCIUM FOR CHEM 8: 1.18 MMOL/L (ref 1.12–1.32)
KETONES UR STRIP-MCNC: NEGATIVE MG/DL
LEUKOCYTE ESTERASE UR QL STRIP: NEGATIVE
LYMPHOCYTES # BLD AUTO: 0.8 X10ˆ3/UL (ref 1–4)
LYMPHOCYTES NFR BLD AUTO: 8.3 %
MCH RBC QN AUTO: 28.3 PG (ref 26–34)
MCHC RBC AUTO-ENTMCNC: 32.6 G/DL (ref 31–37)
MCV RBC AUTO: 86.8 FL (ref 80–100)
MIXED CELL %: 5.2 %
NEUTROPHILS # BLD AUTO: 8.5 X10ˆ3/UL (ref 1.5–7.7)
NEUTROPHILS NFR BLD AUTO: 86.5 %
NITRITE UR QL STRIP: NEGATIVE
PH UR STRIP: 8.5 [PH]
PLATELET # BLD AUTO: 204 X10ˆ3/UL (ref 150–450)
POTASSIUM BLD-SCNC: 4.5 MMOL/L (ref 3.6–5.1)
RBC # BLD AUTO: 5.01 X10ˆ6/UL
SODIUM BLD-SCNC: 140 MMOL/L (ref 136–145)
SP GR UR STRIP: 1.01
UROBILINOGEN UR STRIP-ACNC: <2 MG/DL
WBC # BLD AUTO: 9.8 X10ˆ3/UL (ref 4–11)

## 2023-01-09 PROCEDURE — 74177 CT ABD & PELVIS W/CONTRAST: CPT | Performed by: NURSE PRACTITIONER

## 2023-01-09 PROCEDURE — 36415 COLL VENOUS BLD VENIPUNCTURE: CPT | Performed by: NURSE PRACTITIONER

## 2023-01-09 PROCEDURE — 81002 URINALYSIS NONAUTO W/O SCOPE: CPT | Performed by: NURSE PRACTITIONER

## 2023-01-09 PROCEDURE — 80047 BASIC METABLC PNL IONIZED CA: CPT | Performed by: NURSE PRACTITIONER

## 2023-01-09 PROCEDURE — 81025 URINE PREGNANCY TEST: CPT | Performed by: NURSE PRACTITIONER

## 2023-01-09 PROCEDURE — 85025 COMPLETE CBC W/AUTO DIFF WBC: CPT | Performed by: NURSE PRACTITIONER

## 2023-01-09 PROCEDURE — 99214 OFFICE O/P EST MOD 30 MIN: CPT | Performed by: NURSE PRACTITIONER

## 2023-01-09 RX ORDER — POLYETHYLENE GLYCOL 3350 17 G/17G
17 POWDER, FOR SOLUTION ORAL DAILY PRN
Qty: 30 EACH | Refills: 0 | Status: SHIPPED | OUTPATIENT
Start: 2023-01-09 | End: 2023-02-08

## 2023-01-09 NOTE — ED QUICK NOTES
10:30  Patient left the IC in stable condition via PV to [facility] for CT. Instructed patient to go straight to facility and avoid drinking/eating. She verbalized understanding of instructions given.

## 2023-01-09 NOTE — TELEPHONE ENCOUNTER
Dr. Leora Ma [facility]),    Patient wanted to see you sooner. I rescheduled her appointment below. Do you want to see her before April or is this ok? Thank you! Your appointments     Date & Time Appointment Department [facility])    2023  3:00 PM CDT Follow Up Visit with Prabha Jama MD [facility], [facility], [address], [place] ([facility])    [facility], [facility], [address], [address]  [address] [facility],[address]  [phone]        Called patient,  verified. Patient reported that her ct scan in urgent care today showed stools in her colon. She took miralax and moved her bowels after, usual bm 2x/week. Left lower and mid abdominal pain is intermittent, 6/10, no other GI symptoms. Patient education given below:  - Drink a hot beverage each morning  - Make sure diet is adequate in volume, fiber, and fluids 6-8 (8oz) glasses a day unless fluid restriction  - Increase exercise as tolerated  -Take Miralax as ordered. - If rectal pain from constipation-sit in a warm bath for approx 15-20 minutes    If severe abdominal pain, go to ED. Patient verbalized understanding.

## 2023-01-09 NOTE — DISCHARGE INSTRUCTIONS
CT looks good. Start miralax daily. Drink water and increase fiber and vegetables. Take magnesium sulfate (over the counter) to start bowel movements. See Dr Fantasma uKhn, call for sooner appointment.

## 2023-01-13 NOTE — TELEPHONE ENCOUNTER
Patient added to schedule    Your Appointments    Tuesday January 24, 2023  2:00 PM  Follow Up Visit with Mar Ashton MD  [facility], [facility], [address], [facility] ([place] [address]) [address] [facility]  [phone]

## 2023-01-13 NOTE — TELEPHONE ENCOUNTER
Patient returned call and confirmed appt - TEJAL HARRISON [facility].

## 2023-01-13 NOTE — TELEPHONE ENCOUNTER
Left message to offer    2pm on 1/24 at [facility] with Dr. Vidal Hernadez  (I got ok to double book this appointment)    CSS:  If patient calls back may offer and add to Dr. Ashley Hector schedule for above appointment.

## 2023-01-24 ENCOUNTER — LAB ENCOUNTER (OUTPATIENT)
Dept: LAB | Age: 23
End: 2023-01-24
Attending: INTERNAL MEDICINE
Payer: MEDICAID

## 2023-01-24 ENCOUNTER — OFFICE VISIT (OUTPATIENT)
Dept: GASTROENTEROLOGY | Facility: CLINIC | Age: 23
End: 2023-01-24

## 2023-01-24 VITALS
SYSTOLIC BLOOD PRESSURE: 100 MMHG | BODY MASS INDEX: 21.68 KG/M2 | WEIGHT: 114.81 LBS | HEART RATE: 79 BPM | DIASTOLIC BLOOD PRESSURE: 69 MMHG | HEIGHT: 61 IN

## 2023-01-24 DIAGNOSIS — D50.9 IRON DEFICIENCY ANEMIA, UNSPECIFIED IRON DEFICIENCY ANEMIA TYPE: Primary | ICD-10-CM

## 2023-01-24 DIAGNOSIS — D50.9 IRON DEFICIENCY ANEMIA, UNSPECIFIED IRON DEFICIENCY ANEMIA TYPE: ICD-10-CM

## 2023-01-24 LAB
CRP SERPL-MCNC: 0.56 MG/DL (ref ?–0.3)
IGA SERPL-MCNC: 171 MG/DL (ref 70–312)

## 2023-01-24 PROCEDURE — 36415 COLL VENOUS BLD VENIPUNCTURE: CPT

## 2023-01-24 PROCEDURE — 99213 OFFICE O/P EST LOW 20 MIN: CPT | Performed by: INTERNAL MEDICINE

## 2023-01-24 PROCEDURE — 3074F SYST BP LT 130 MM HG: CPT | Performed by: INTERNAL MEDICINE

## 2023-01-24 PROCEDURE — 3008F BODY MASS INDEX DOCD: CPT | Performed by: INTERNAL MEDICINE

## 2023-01-24 PROCEDURE — 86140 C-REACTIVE PROTEIN: CPT

## 2023-01-24 PROCEDURE — 86364 TISS TRNSGLTMNASE EA IG CLAS: CPT

## 2023-01-24 PROCEDURE — 3078F DIAST BP <80 MM HG: CPT | Performed by: INTERNAL MEDICINE

## 2023-01-24 PROCEDURE — 82784 ASSAY IGA/IGD/IGG/IGM EACH: CPT

## 2023-01-24 RX ORDER — FAMOTIDINE 20 MG/1
20 TABLET, FILM COATED ORAL 2 TIMES DAILY
Qty: 60 TABLET | Refills: 0 | Status: SHIPPED | OUTPATIENT
Start: 2023-01-24

## 2023-01-24 NOTE — PATIENT INSTRUCTIONS
Anemia   Constipation /abdominal bloating  - stool testing inflammation  - blood test for celiac  - consider capsule endoscopy     Acid reflux  - avoid spicy foods  - trial of famotidine twice a day for 1 month

## 2023-01-26 LAB — TTG IGA SER-ACNC: 0.5 U/ML (ref ?–7)

## 2023-01-28 ENCOUNTER — LAB ENCOUNTER (OUTPATIENT)
Dept: LAB | Age: 23
End: 2023-01-28
Attending: INTERNAL MEDICINE
Payer: MEDICAID

## 2023-01-28 DIAGNOSIS — D50.9 IRON DEFICIENCY ANEMIA, UNSPECIFIED IRON DEFICIENCY ANEMIA TYPE: ICD-10-CM

## 2023-01-28 PROCEDURE — 83993 ASSAY FOR CALPROTECTIN FECAL: CPT

## 2023-01-31 LAB — CALPROTECTIN STL-MCNT: 37.3 ΜG/G (ref ?–50)

## 2023-03-02 RX ORDER — NORGESTIMATE AND ETHINYL ESTRADIOL 7DAYSX3 28
1 KIT ORAL DAILY
Qty: 84 TABLET | Refills: 3 | Status: SHIPPED | OUTPATIENT
Start: 2023-03-02

## 2023-03-03 NOTE — TELEPHONE ENCOUNTER
Pap Smear (Every 3 Years)  Next due on 12/22/2023     Future Appointments   Date Time Provider Kailyn Bourgeois   4/6/2023  3:00 PM Tennille Guerrier MD [facility]           Refill passed per [facility], [employer] protocol.      Requested Prescriptions   Pending Prescriptions Disp Refills    TRI-SPRINTEC 0.18/0.215/0.25 MG-35 MCG Oral Tab [Pharmacy Med Name: TRI-SPRINTEC TABLETS 28S] 28 tablet 12     Sig: TAKE 1 TABLET BY MOUTH DAILY       Gynecology Medication Protocol Failed - 3/2/2023  3:18 PM        Failed - Pass dependent on manual look-up of last PAP and patient compliance with PAP follow up recommendations        Passed - In person appointment or virtual visit in the past 12 mos or appointment in next 3 mos     Recent Outpatient Visits              1 month ago Iron deficiency anemia, unspecified iron deficiency anemia type    [facility], [address] [facility], [place] Tennille Guerrier MD    Office Visit    5 months ago Fatigue associated with anemia    [facility], [address], [place], [place]    Office Visit    6 months ago Acne vulgaris    [address] [facility], Laisha Almonte, Adriano Ortega MD    Office Visit    7 months ago Urge incontinence    [address] [facility], [employer], [address], APRN    Office Visit    9 months ago [address], [address], [place] PA-C    Office Visit          Future Appointments         Provider Department Appt Notes    In 1 month Tennille Guerrier MD [address], [address] [facility], [place] FOLLOW UP/SG                     Future Appointments         Provider Department Appt Notes    In 1 month Tennille Guerrier [facility], [address] [facility], [place] FOLLOW UP/SG             Recent Outpatient Visits              1 month ago Iron deficiency anemia, unspecified iron deficiency anemia type    [facility], [address] [place] [address] Rd,[address], [place] Rob Mancini MD    Office Visit    5 months ago Fatigue associated with anemia    [facility], [address], [place], [place]    Office Visit    6 months ago Acne vulgaris    Daysi Adame, Clara Hong MD    Office Visit    7 months ago Urge incontinence    Antonio Sharma, [employer], JADEN Aguilar    Office Visit    9 months ago [address], [address], [place], [place]    Office Visit

## 2023-03-09 ENCOUNTER — OFFICE VISIT (OUTPATIENT)
Dept: FAMILY MEDICINE CLINIC | Facility: CLINIC | Age: 23
End: 2023-03-09

## 2023-03-09 VITALS
DIASTOLIC BLOOD PRESSURE: 67 MMHG | HEART RATE: 69 BPM | WEIGHT: 110 LBS | BODY MASS INDEX: 20.77 KG/M2 | HEIGHT: 61 IN | SYSTOLIC BLOOD PRESSURE: 107 MMHG

## 2023-03-09 DIAGNOSIS — R05.1 ACUTE COUGH: Primary | ICD-10-CM

## 2023-03-09 DIAGNOSIS — Z30.41 FAMILY PLANNING, BCP MAINTENANCE: ICD-10-CM

## 2023-03-09 LAB
CONTROL LINE PRESENT WITH A CLEAR BACKGROUND (YES/NO): YES YES/NO
PREGNANCY TEST, URINE: NEGATIVE

## 2023-03-09 PROCEDURE — 3078F DIAST BP <80 MM HG: CPT | Performed by: PHYSICIAN ASSISTANT

## 2023-03-09 PROCEDURE — 3074F SYST BP LT 130 MM HG: CPT | Performed by: PHYSICIAN ASSISTANT

## 2023-03-09 PROCEDURE — 81025 URINE PREGNANCY TEST: CPT | Performed by: PHYSICIAN ASSISTANT

## 2023-03-09 PROCEDURE — 99213 OFFICE O/P EST LOW 20 MIN: CPT | Performed by: PHYSICIAN ASSISTANT

## 2023-03-09 PROCEDURE — 3008F BODY MASS INDEX DOCD: CPT | Performed by: PHYSICIAN ASSISTANT

## 2023-04-20 ENCOUNTER — OFFICE VISIT (OUTPATIENT)
Dept: FAMILY MEDICINE CLINIC | Facility: CLINIC | Age: 23
End: 2023-04-20

## 2023-04-20 ENCOUNTER — HOSPITAL ENCOUNTER (OUTPATIENT)
Dept: GENERAL RADIOLOGY | Age: 23
Discharge: HOME | End: 2023-04-20
Attending: PHYSICIAN ASSISTANT
Payer: MEDICAID

## 2023-04-20 VITALS
BODY MASS INDEX: 21.34 KG/M2 | SYSTOLIC BLOOD PRESSURE: 102 MMHG | WEIGHT: 113 LBS | HEIGHT: 61 IN | DIASTOLIC BLOOD PRESSURE: 68 MMHG | HEART RATE: 72 BPM

## 2023-04-20 DIAGNOSIS — M79.671 RIGHT FOOT PAIN: Primary | ICD-10-CM

## 2023-04-20 DIAGNOSIS — M79.671 RIGHT FOOT PAIN: ICD-10-CM

## 2023-04-20 DIAGNOSIS — S93.601A FOOT SPRAIN, RIGHT, INITIAL ENCOUNTER: ICD-10-CM

## 2023-04-20 PROCEDURE — 3008F BODY MASS INDEX DOCD: CPT | Performed by: PHYSICIAN ASSISTANT

## 2023-04-20 PROCEDURE — 3074F SYST BP LT 130 MM HG: CPT | Performed by: PHYSICIAN ASSISTANT

## 2023-04-20 PROCEDURE — 73630 X-RAY EXAM OF FOOT: CPT | Performed by: PHYSICIAN ASSISTANT

## 2023-04-20 PROCEDURE — 99213 OFFICE O/P EST LOW 20 MIN: CPT | Performed by: PHYSICIAN ASSISTANT

## 2023-04-20 PROCEDURE — 3078F DIAST BP <80 MM HG: CPT | Performed by: PHYSICIAN ASSISTANT

## 2023-07-05 ENCOUNTER — NURSE TRIAGE (OUTPATIENT)
Dept: FAMILY MEDICINE CLINIC | Facility: CLINIC | Age: 23
End: 2023-07-05

## 2023-07-05 NOTE — TELEPHONE ENCOUNTER
Pt was called and she stated that she has been having urine incontinence for more then 1 year she has been seen for this by a specialist and was inform to do some Physical therapy. For the past 2 months it has become more often. Pt denied any burning on urination or having low back pain or fever. Pt also stated that at night she she will start coughing due to her having a lot of Phlegm. Pt stated that she has been seen for this and was inform her lungs are fine but pt not sure if it can be due to allergies or her asthma. Pt feels some sob only when she does exercise. Pt will keep her appt for tomorrow.        Future Appointments   Date Time Provider Kailyn Bourgeois   7/6/2023 11:30 AM [facility]al [facility]   7/6/2023  5:00 PM Lavell Hernandez MD [facility]       Reason for Disposition   Can't control passage of urine (i.e., urinary incontinence, wetting self) and present > 2 weeks    Protocols used: Urinary Symptoms-A-OH

## 2023-07-05 NOTE — TELEPHONE ENCOUNTER
Patient scheduled an appointment online.  Please see below     \"Urinary incontinence and choking/coughing during sleep\"

## 2023-07-06 ENCOUNTER — OFFICE VISIT (OUTPATIENT)
Facility: CLINIC | Age: 23
End: 2023-07-06

## 2023-07-06 ENCOUNTER — LAB ENCOUNTER (OUTPATIENT)
Dept: LAB | Facility: HOSPITAL | Age: 23
End: 2023-07-06
Attending: INTERNAL MEDICINE
Payer: MEDICAID

## 2023-07-06 ENCOUNTER — OFFICE VISIT (OUTPATIENT)
Dept: FAMILY MEDICINE CLINIC | Facility: CLINIC | Age: 23
End: 2023-07-06

## 2023-07-06 VITALS
BODY MASS INDEX: 21.9 KG/M2 | WEIGHT: 116 LBS | HEART RATE: 66 BPM | HEIGHT: 61 IN | DIASTOLIC BLOOD PRESSURE: 72 MMHG | SYSTOLIC BLOOD PRESSURE: 104 MMHG

## 2023-07-06 VITALS
WEIGHT: 116 LBS | HEART RATE: 65 BPM | DIASTOLIC BLOOD PRESSURE: 73 MMHG | HEIGHT: 61 IN | BODY MASS INDEX: 21.9 KG/M2 | SYSTOLIC BLOOD PRESSURE: 106 MMHG

## 2023-07-06 DIAGNOSIS — R10.30 LOWER ABDOMINAL PAIN: Primary | ICD-10-CM

## 2023-07-06 DIAGNOSIS — D50.9 IRON DEFICIENCY ANEMIA, UNSPECIFIED IRON DEFICIENCY ANEMIA TYPE: ICD-10-CM

## 2023-07-06 DIAGNOSIS — G89.29 CHRONIC PAIN IN FEMALE PELVIS: Primary | ICD-10-CM

## 2023-07-06 DIAGNOSIS — R10.2 CHRONIC PAIN IN FEMALE PELVIS: Primary | ICD-10-CM

## 2023-07-06 DIAGNOSIS — K59.00 CONSTIPATION, UNSPECIFIED CONSTIPATION TYPE: ICD-10-CM

## 2023-07-06 DIAGNOSIS — R10.30 LOWER ABDOMINAL PAIN: ICD-10-CM

## 2023-07-06 DIAGNOSIS — R32 URINARY INCONTINENCE, UNSPECIFIED TYPE: ICD-10-CM

## 2023-07-06 DIAGNOSIS — Z11.3 SCREENING EXAMINATION FOR STD (SEXUALLY TRANSMITTED DISEASE): ICD-10-CM

## 2023-07-06 LAB
APPEARANCE: CLEAR
BASOPHILS # BLD AUTO: 0.04 X10(3) UL (ref 0–0.2)
BASOPHILS NFR BLD AUTO: 0.6 %
BILIRUBIN: NEGATIVE
DEPRECATED HBV CORE AB SER IA-ACNC: 77.9 NG/ML
DEPRECATED RDW RBC AUTO: 43.4 FL (ref 35.1–46.3)
EOSINOPHIL # BLD AUTO: 0.05 X10(3) UL (ref 0–0.7)
EOSINOPHIL NFR BLD AUTO: 0.8 %
ERYTHROCYTE [DISTWIDTH] IN BLOOD BY AUTOMATED COUNT: 13.2 % (ref 11–15)
GLUCOSE (URINE DIPSTICK): NEGATIVE MG/DL
HCG SERPL QL: NEGATIVE
HCT VFR BLD AUTO: 39.8 %
HGB BLD-MCNC: 12.9 G/DL
IMM GRANULOCYTES # BLD AUTO: 0.02 X10(3) UL (ref 0–1)
IMM GRANULOCYTES NFR BLD: 0.3 %
IRON SATN MFR SERPL: 26 %
IRON SERPL-MCNC: 87 UG/DL
KETONES (URINE DIPSTICK): NEGATIVE MG/DL
LEUKOCYTES: NEGATIVE
LYMPHOCYTES # BLD AUTO: 2.3 X10(3) UL (ref 1–4)
LYMPHOCYTES NFR BLD AUTO: 35.3 %
MCH RBC QN AUTO: 28.7 PG (ref 26–34)
MCHC RBC AUTO-ENTMCNC: 32.4 G/DL (ref 31–37)
MCV RBC AUTO: 88.4 FL
MONOCYTES # BLD AUTO: 0.5 X10(3) UL (ref 0.1–1)
MONOCYTES NFR BLD AUTO: 7.7 %
MULTISTIX EXPIRATION DATE: NORMAL DATE
MULTISTIX LOT#: NORMAL NUMERIC
NEUTROPHILS # BLD AUTO: 3.6 X10 (3) UL (ref 1.5–7.7)
NEUTROPHILS # BLD AUTO: 3.6 X10(3) UL (ref 1.5–7.7)
NEUTROPHILS NFR BLD AUTO: 55.3 %
NITRITE, URINE: NEGATIVE
OCCULT BLOOD: NEGATIVE
PH, URINE: 7 (ref 4.5–8)
PLATELET # BLD AUTO: 234 10(3)UL (ref 150–450)
PROTEIN (URINE DIPSTICK): NEGATIVE MG/DL
RBC # BLD AUTO: 4.5 X10(6)UL
SPECIFIC GRAVITY: 1.01 (ref 1–1.03)
TIBC SERPL-MCNC: 340 UG/DL (ref 240–450)
TRANSFERRIN SERPL-MCNC: 228 MG/DL (ref 200–360)
URINE-COLOR: YELLOW
UROBILINOGEN,SEMI-QN: 0.2 MG/DL (ref 0–1.9)
WBC # BLD AUTO: 6.5 X10(3) UL (ref 4–11)

## 2023-07-06 PROCEDURE — 3008F BODY MASS INDEX DOCD: CPT | Performed by: INTERNAL MEDICINE

## 2023-07-06 PROCEDURE — 85025 COMPLETE CBC W/AUTO DIFF WBC: CPT

## 2023-07-06 PROCEDURE — 3074F SYST BP LT 130 MM HG: CPT | Performed by: PHYSICIAN ASSISTANT

## 2023-07-06 PROCEDURE — 82728 ASSAY OF FERRITIN: CPT

## 2023-07-06 PROCEDURE — 90471 IMMUNIZATION ADMIN: CPT | Performed by: PHYSICIAN ASSISTANT

## 2023-07-06 PROCEDURE — 3078F DIAST BP <80 MM HG: CPT | Performed by: PHYSICIAN ASSISTANT

## 2023-07-06 PROCEDURE — 3078F DIAST BP <80 MM HG: CPT | Performed by: INTERNAL MEDICINE

## 2023-07-06 PROCEDURE — 84466 ASSAY OF TRANSFERRIN: CPT

## 2023-07-06 PROCEDURE — 3008F BODY MASS INDEX DOCD: CPT | Performed by: PHYSICIAN ASSISTANT

## 2023-07-06 PROCEDURE — 83540 ASSAY OF IRON: CPT

## 2023-07-06 PROCEDURE — 3074F SYST BP LT 130 MM HG: CPT | Performed by: INTERNAL MEDICINE

## 2023-07-06 PROCEDURE — 90651 9VHPV VACCINE 2/3 DOSE IM: CPT | Performed by: PHYSICIAN ASSISTANT

## 2023-07-06 PROCEDURE — 36415 COLL VENOUS BLD VENIPUNCTURE: CPT

## 2023-07-06 PROCEDURE — 99213 OFFICE O/P EST LOW 20 MIN: CPT | Performed by: PHYSICIAN ASSISTANT

## 2023-07-06 PROCEDURE — 84703 CHORIONIC GONADOTROPIN ASSAY: CPT

## 2023-07-06 PROCEDURE — 81002 URINALYSIS NONAUTO W/O SCOPE: CPT | Performed by: PHYSICIAN ASSISTANT

## 2023-07-06 PROCEDURE — 99214 OFFICE O/P EST MOD 30 MIN: CPT | Performed by: INTERNAL MEDICINE

## 2023-07-06 RX ORDER — ARIPIPRAZOLE 5 MG/1
5 TABLET ORAL DAILY
Qty: 30 TABLET | Refills: 0 | COMMUNITY
Start: 2023-07-06

## 2023-07-06 RX ORDER — FLUOXETINE HYDROCHLORIDE 60 MG/1
60 TABLET, FILM COATED ORAL; ORAL DAILY
Qty: 30 TABLET | Refills: 0 | COMMUNITY
Start: 2023-07-06

## 2023-07-06 RX ORDER — LINACLOTIDE 72 UG/1
72 CAPSULE, GELATIN COATED ORAL DAILY
Qty: 30 CAPSULE | Refills: 1 | Status: SHIPPED | OUTPATIENT
Start: 2023-07-06 | End: 2023-08-05

## 2023-07-06 NOTE — PROGRESS NOTES
Cha Miguel is a 20year old female. HPI:   Patient presents with: Follow - Up    The patient is a 23-year-old female who has a history of acne, anemia, constipation and depression who presents for follow-up today. She has made some dietary adjustments but is still having difficulty with bowel habits. She reports more of a constipation type issue and abdominal discomfort and bloating. We discussed that she likely has underlying IBS/C. She has tried high-fiber diets, stool softeners, laxatives and MiraLAX without consistent or significant results. She does have a history of anemia. Colonoscopy and EGD from April 2021 showed small internal hemorrhoids and gastritis. Otherwise negative exams for source of patient's anemia. HISTORY:  Past Medical History:   Diagnosis Date    Acne 08/2019    Anemia     Constipation     Depression       Past Surgical History:   Procedure Laterality Date    COLONOSCOPY N/A 4/19/2021    Procedure: COLONOSCOPY;  Surgeon: Sanjeev Lnog MD;  Location: [facility]      Family History   Problem Relation Age of Onset    Diabetes Father       Social History:   Social History     Socioeconomic History    Marital status: Single   Tobacco Use    Smoking status: Never    Smokeless tobacco: Never   Vaping Use    Vaping Use: Never used   Substance and Sexual Activity    Alcohol use: Yes     Alcohol/week: 2.0 standard drinks of alcohol     Types: 2 Standard drinks or equivalent per week     Comment: rarely    Drug use: No   Other Topics Concern    History of tanning No    Outdoor occupation No    Reaction to local anesthetic No   Social History Narrative    ** Merged History Encounter **             Medications (Active prior to today's visit):  Current Outpatient Medications   Medication Sig Dispense Refill    ARIPiprazole (ABILIFY) 5 MG Oral Tab Take 1 tablet (5 mg total) by mouth daily. 30 tablet 0    FLUoxetine HCl 60 MG Oral Tab Take 60 mg by mouth daily.  30 tablet 0 Norgestim-Eth Estrad Triphasic (TRI-SPRINTEC) 0.18/0.215/0.25 MG-35 MCG Oral Tab Take 1 tablet by mouth daily. 84 tablet 3       Allergies:  No Known Allergies      ROS:   The patient denies any chest pain or shortness of breath,  No neurologic or dermatologic symptoms. PHYSICAL EXAM:   Blood pressure 106/73, pulse 65, height 5' 1\" (1.549 m), weight 116 lb (52.6 kg), last menstrual period 04/08/2023, not currently breastfeeding. The patient appears their stated age and is in no acute distress  HEENT- anicteric sclera, neck no lymphadnopathy, OP- clear with no masses or lesions  Chest- Clear bilaterally, no wheezing,  Heart- regular rate, no murmur or gallop  Abdomen- Soft and nontender, nondistended  Ext- no clubbing or cyanosis  Skin- no rashes or lesions  Neuro- appropriate response, alert, no confusion  . ASSESSMENT/PLAN:   Assessment     The patient has a history of abdominal pain, constipation and anemia. Discussed she likely has underlying IBS/C, continue on high-fiber diet and fluids and we discussed a trial of Linzess to be taken daily. Risks and benefits of medication were reviewed and she agrees to proceed. If she develops any diarrhea or issues then she will stop taking medication and call the office immediately. The patient has anemia -- plan to recheck blood counts, consider capsule endoscopy if ongoing issues. Plan  Abdominal pain  Constipation   - likley IBS - C   - continue on high fiber diet and fluids  - trial of Linzess daily   Call with update in 1 month, stop if diarrhea    Anemia  - recheck blood count now. - consider capsule endoscopy if ongoing issues with anemia       Orders This Visit:  No orders of the defined types were placed in this encounter.       Meds This Visit:  Requested Prescriptions      No prescriptions requested or ordered in this encounter       Imaging & Referrals:  None       [facility], [address] [place] [address] Gastroenterology

## 2023-07-06 NOTE — PATIENT INSTRUCTIONS
Abdominal pain  Constipation   - jude IBS - C   - continue on high fiber diet and fluids  - trial of Linzess daily   Call with update in 1 month, stop if diarrhea    Anemia  - recheck blood count now.    - consider capsule endoscopy if ongoing issues with anemia

## 2023-07-07 LAB
C TRACH DNA SPEC QL NAA+PROBE: NEGATIVE
N GONORRHOEA DNA SPEC QL NAA+PROBE: NEGATIVE

## 2023-07-11 ENCOUNTER — TELEPHONE (OUTPATIENT)
Dept: GASTROENTEROLOGY | Facility: CLINIC | Age: 23
End: 2023-07-11

## 2023-07-23 ENCOUNTER — HOSPITAL ENCOUNTER (OUTPATIENT)
Dept: ULTRASOUND IMAGING | Age: 23
End: 2023-07-23
Attending: PHYSICIAN ASSISTANT
Payer: MEDICAID

## 2023-07-23 ENCOUNTER — HOSPITAL ENCOUNTER (OUTPATIENT)
Dept: ULTRASOUND IMAGING | Age: 23
Discharge: HOME | End: 2023-07-23
Attending: PHYSICIAN ASSISTANT
Payer: MEDICAID

## 2023-07-23 DIAGNOSIS — R10.2 CHRONIC PAIN IN FEMALE PELVIS: ICD-10-CM

## 2023-07-23 DIAGNOSIS — G89.29 CHRONIC PAIN IN FEMALE PELVIS: ICD-10-CM

## 2023-07-23 PROCEDURE — 76856 US EXAM PELVIC COMPLETE: CPT | Performed by: PHYSICIAN ASSISTANT

## 2023-07-23 PROCEDURE — 93975 VASCULAR STUDY: CPT | Performed by: PHYSICIAN ASSISTANT

## 2023-07-23 PROCEDURE — 76830 TRANSVAGINAL US NON-OB: CPT | Performed by: PHYSICIAN ASSISTANT

## 2024-04-30 ENCOUNTER — OFFICE VISIT (OUTPATIENT)
Dept: FAMILY MEDICINE CLINIC | Facility: CLINIC | Age: 24
End: 2024-04-30
Payer: COMMERCIAL

## 2024-04-30 VITALS
WEIGHT: 139 LBS | HEART RATE: 92 BPM | SYSTOLIC BLOOD PRESSURE: 106 MMHG | HEIGHT: 61 IN | BODY MASS INDEX: 26.24 KG/M2 | DIASTOLIC BLOOD PRESSURE: 68 MMHG

## 2024-04-30 DIAGNOSIS — H65.92 LEFT NON-SUPPURATIVE OTITIS MEDIA: Primary | ICD-10-CM

## 2024-04-30 PROCEDURE — 99213 OFFICE O/P EST LOW 20 MIN: CPT | Performed by: FAMILY MEDICINE

## 2024-04-30 PROCEDURE — 3008F BODY MASS INDEX DOCD: CPT | Performed by: FAMILY MEDICINE

## 2024-04-30 PROCEDURE — 3078F DIAST BP <80 MM HG: CPT | Performed by: FAMILY MEDICINE

## 2024-04-30 PROCEDURE — 3074F SYST BP LT 130 MM HG: CPT | Performed by: FAMILY MEDICINE

## 2024-04-30 RX ORDER — FLUTICASONE PROPIONATE 50 MCG
2 SPRAY, SUSPENSION (ML) NASAL DAILY
Qty: 1 EACH | Refills: 1 | Status: SHIPPED | OUTPATIENT
Start: 2024-04-30 | End: 2024-05-01

## 2024-04-30 RX ORDER — ARIPIPRAZOLE 10 MG/1
TABLET ORAL
COMMUNITY
Start: 2024-04-16

## 2024-04-30 RX ORDER — BUSPIRONE HYDROCHLORIDE 15 MG/1
TABLET ORAL
COMMUNITY
Start: 2024-04-16

## 2024-04-30 RX ORDER — AMOXICILLIN AND CLAVULANATE POTASSIUM 875; 125 MG/1; MG/1
1 TABLET, FILM COATED ORAL 2 TIMES DAILY
Qty: 20 TABLET | Refills: 0 | Status: SHIPPED | OUTPATIENT
Start: 2024-04-30 | End: 2024-05-10

## 2024-04-30 RX ORDER — ONDANSETRON 4 MG/1
4 TABLET, FILM COATED ORAL EVERY 6 HOURS PRN
COMMUNITY
Start: 2024-02-01

## 2024-04-30 RX ORDER — GABAPENTIN 100 MG/1
CAPSULE ORAL
COMMUNITY
Start: 2024-04-16

## 2024-04-30 NOTE — PROGRESS NOTES
Blood pressure 106/68, pulse 92, height 5' 1\" (1.549 m), weight 139 lb (63 kg), not currently breastfeeding.          Complaining of poor hearing left ear.  Nasal congestion for a week.  Loss of hearing for a week no pain.  No recent swimming.  No fever.    Objective left tympanic membrane erythematous    Swollen left anterior cervical lymph node    Throat exam shows cobblestoning no exudate    Assessment otitis media with decreased hearing    Plan Augmentin prescription and Sudafed 12-hour    Flonase prescription proper use demonstrated    Follow-up with ENT if no improvement after 1 week

## 2024-05-01 RX ORDER — FLUTICASONE PROPIONATE 50 MCG
2 SPRAY, SUSPENSION (ML) NASAL DAILY
Qty: 48 G | Refills: 3 | Status: SHIPPED | OUTPATIENT
Start: 2024-05-01

## 2024-05-01 NOTE — TELEPHONE ENCOUNTER
Refill passed per [facility] protocol.  Requested Prescriptions   Pending Prescriptions Disp Refills    FLUTICASONE PROPIONATE 50 MCG/ACT Nasal Suspension [Pharmacy Med Name: FLUTICASONE 50MCG NASAL SP (120) RX] 48 g 0     Sig: ADMINISTER 2 SPRAYS IN EACH NOSTRIL DAILY. SQUEEZE NOSE AFTER SPRAYS. DO NOT SNORT INTO THE BACK OF YOUR THROAT.       Allergy Medication Protocol Passed - 4/30/2024  3:21 PM        Passed - In person appointment or virtual visit in the past 12 mos or appointment in next 3 mos     Recent Outpatient Visits              Yesterday Left non-suppurative otitis media    [facility] [place]    Office Visit    10 months ago Chronic pain in female pelvis    [facility] [place]    Office Visit    10 months ago Lower abdominal pain    [facility], Jarek Mckeon MD    Office Visit    [facility]    [facility] Visit    1 year ago Acute cough    [facility] [place]    Office Visit                         Recent Outpatient Visits              Yesterday Left non-suppurative otitis media    [facility] [place]    Office Visit    10 months ago Chronic pain in female pelvis    [facility] [place]    Office Visit    10 months ago Lower abdominal pain    [facility], Jarek Mckeon MD    Office Visit    1 year ago [facility] foot [facility]    Office Visit    1 year ago Acute cough    [facility] [place] Nguyen, Minhxuyen, PA-C    Office Visit

## 2024-05-07 ENCOUNTER — OFFICE VISIT (OUTPATIENT)
Dept: OTOLARYNGOLOGY | Facility: CLINIC | Age: 24
End: 2024-05-07
Payer: COMMERCIAL

## 2024-05-07 ENCOUNTER — OFFICE VISIT (OUTPATIENT)
Dept: AUDIOLOGY | Facility: CLINIC | Age: 24
End: 2024-05-07

## 2024-05-07 DIAGNOSIS — H69.90 EUSTACHIAN TUBE DISORDER, UNSPECIFIED LATERALITY: ICD-10-CM

## 2024-05-07 DIAGNOSIS — H91.90 HEARING LOSS, UNSPECIFIED HEARING LOSS TYPE, UNSPECIFIED LATERALITY: Primary | ICD-10-CM

## 2024-05-07 DIAGNOSIS — H90.12 CONDUCTIVE HEARING LOSS OF LEFT EAR WITH UNRESTRICTED HEARING OF RIGHT EAR: Primary | ICD-10-CM

## 2024-05-07 DIAGNOSIS — H65.92 FLUID LEVEL BEHIND TYMPANIC MEMBRANE OF LEFT EAR: ICD-10-CM

## 2024-05-07 PROCEDURE — 99244 OFF/OP CNSLTJ NEW/EST MOD 40: CPT | Performed by: STUDENT IN AN ORGANIZED HEALTH CARE EDUCATION/TRAINING PROGRAM

## 2024-05-07 PROCEDURE — 92567 TYMPANOMETRY: CPT | Performed by: AUDIOLOGIST

## 2024-05-07 PROCEDURE — 92557 COMPREHENSIVE HEARING TEST: CPT | Performed by: AUDIOLOGIST

## 2024-05-07 PROCEDURE — 92504 EAR MICROSCOPY EXAMINATION: CPT | Performed by: STUDENT IN AN ORGANIZED HEALTH CARE EDUCATION/TRAINING PROGRAM

## 2024-05-07 RX ORDER — METHYLPREDNISOLONE 4 MG/1
TABLET ORAL
Qty: 21 TABLET | Refills: 0 | Status: SHIPPED | OUTPATIENT
Start: 2024-05-07 | End: 2024-05-09 | Stop reason: ALTCHOICE

## 2024-05-07 RX ORDER — FLUTICASONE PROPIONATE 50 MCG
2 SPRAY, SUSPENSION (ML) NASAL 2 TIMES DAILY
Qty: 16 G | Refills: 3 | Status: SHIPPED | OUTPATIENT
Start: 2024-05-07 | End: 2024-05-09

## 2024-05-07 RX ORDER — PSEUDOEPHEDRINE HCL 120 MG/1
120 TABLET, FILM COATED, EXTENDED RELEASE ORAL EVERY 12 HOURS
Qty: 60 TABLET | Refills: 3 | Status: SHIPPED | OUTPATIENT
Start: 2024-05-07

## 2024-05-07 NOTE — PROGRESS NOTES
Jose Alberto Ferreira is a 23 year old female.   Chief Complaint   Patient presents with    Hearing Loss     LT ear, x3 weeks.     Sinus Problem     C/o sneezing attacks.        ASSESSMENT AND PLAN:   1. Hearing loss, unspecified hearing loss type, unspecified laterality    - OP REFERRAL TO AUDIOLOGY    2. Eustachian tube disorder, unspecified laterality  This is a 23-year-old who has had left ear hearing loss for about 3 weeks.  She states she was out for a run and when she got back she developed this effusion in her left ear.  She has not been taking any medication for this    On exam she has an opaque and thickened left tympanic membrane.      She had a flat tympanogram on the left with an air-bone gap on the left    She has an effusion on the left based upon her exam and hearing test.  Will prescribe a Medrol Dosepak, Sudafed and Flonase.  Discussed the use and pertinent side effects.  Will see her back in 2 to 3 weeks to consider myringotomy if still not improving. Consult from Dr Hitchcock regarding ear evaluation    3. Fluid level behind tympanic membrane of left ear        The patient indicates understanding of these issues and agrees to the plan.      EXAM:   There were no vitals taken for this visit.    Pertinent exam findings may also be noted above in assessment and plan     System Details   Skin Inspection - Normal.   Constitutional Overall appearance - Normal.   Head/Face Symmetric, TMJ tenderness not present    Eyes EOMI, PERRL   Right ear:  Canal clear, TM intact, no TERRY   Left ear:  Canal clear, TM intact, no TERRY   Nose: Septum midline, inferior turbinates not enlarged, nasal valves without collapse    Oral cavity/Oropharynx: No lesions or masses on inspection or palpation, tonsils symmetric    Neck: Soft without LAD, thyroid not enlarged  Voice clear/ no stridor   Other:      Scopes and Procedures:             Current Outpatient Medications   Medication Sig Dispense Refill    fluticasone propionate 50  MCG/ACT Nasal Suspension 2 sprays by Nasal route daily. 48 g 3    gabapentin 100 MG Oral Cap       ondansetron (ZOFRAN) 4 mg tablet Take 1 tablet (4 mg total) by mouth every 6 (six) hours as needed.      busPIRone 15 MG Oral Tab       ARIPiprazole 10 MG Oral Tab       amoxicillin clavulanate 875-125 MG Oral Tab Take 1 tablet by mouth 2 (two) times daily for 10 days. 20 tablet 0    FLUoxetine HCl 60 MG Oral Tab Take 60 mg by mouth daily. 30 tablet 0    Norgestim-Eth Estrad Triphasic (TRI-SPRINTEC) 0.18/0.215/0.25 MG-35 MCG Oral Tab Take 1 tablet by mouth daily. 84 tablet 3      Past Medical History:    Acne    Anemia    Constipation    Depression      Social History:  Social History     Socioeconomic History    Marital status: Single   Tobacco Use    Smoking status: Never    Smokeless tobacco: Never   Vaping Use    Vaping status: Never Used   Substance and Sexual Activity    Alcohol use: Yes     Alcohol/week: 2.0 standard drinks of alcohol     Types: 2 Standard drinks or equivalent per week     Comment: rarely    Drug use: No   Other Topics Concern    History of tanning No    Outdoor occupation No    Reaction to local anesthetic No   Social History Narrative    ** Merged History Encounter **          Social Determinants of Health      Received from [facility]          To Melara MD  5/7/2024  3:00 PM

## 2024-05-09 ENCOUNTER — HOSPITAL ENCOUNTER (OUTPATIENT)
Age: 24
Discharge: HOME | End: 2024-05-09
Payer: COMMERCIAL

## 2024-05-09 VITALS
RESPIRATION RATE: 18 BRPM | HEART RATE: 82 BPM | SYSTOLIC BLOOD PRESSURE: 113 MMHG | DIASTOLIC BLOOD PRESSURE: 76 MMHG | TEMPERATURE: 99 F | OXYGEN SATURATION: 100 %

## 2024-05-09 DIAGNOSIS — N76.0 BACTERIAL VAGINOSIS: ICD-10-CM

## 2024-05-09 DIAGNOSIS — Z11.3 ENCOUNTER FOR SCREENING EXAMINATION FOR SEXUALLY TRANSMITTED INFECTION: ICD-10-CM

## 2024-05-09 DIAGNOSIS — R35.0 URINARY FREQUENCY: Primary | ICD-10-CM

## 2024-05-09 DIAGNOSIS — A74.9 CHLAMYDIA: ICD-10-CM

## 2024-05-09 DIAGNOSIS — B96.89 BACTERIAL VAGINOSIS: ICD-10-CM

## 2024-05-09 LAB
BILIRUB UR QL STRIP: NEGATIVE
CLARITY UR: CLEAR
COLOR UR: YELLOW
GLUCOSE UR STRIP-MCNC: NEGATIVE MG/DL
HGB UR QL STRIP: NEGATIVE
KETONES UR STRIP-MCNC: NEGATIVE MG/DL
LEUKOCYTE ESTERASE UR QL STRIP: NEGATIVE
NITRITE UR QL STRIP: NEGATIVE
PH UR STRIP: 6.5 [PH]
PROT UR STRIP-MCNC: NEGATIVE MG/DL
SP GR UR STRIP: 1.01
UROBILINOGEN UR STRIP-ACNC: <2 MG/DL

## 2024-05-09 PROCEDURE — 87491 CHLMYD TRACH DNA AMP PROBE: CPT | Performed by: PHYSICIAN ASSISTANT

## 2024-05-09 PROCEDURE — 99213 OFFICE O/P EST LOW 20 MIN: CPT | Performed by: NURSE PRACTITIONER

## 2024-05-09 PROCEDURE — 81002 URINALYSIS NONAUTO W/O SCOPE: CPT | Performed by: PHYSICIAN ASSISTANT

## 2024-05-09 PROCEDURE — 81514 NFCT DS BV&VAGINITIS DNA ALG: CPT | Performed by: PHYSICIAN ASSISTANT

## 2024-05-09 PROCEDURE — 99214 OFFICE O/P EST MOD 30 MIN: CPT | Performed by: PHYSICIAN ASSISTANT

## 2024-05-09 PROCEDURE — 87591 N.GONORRHOEAE DNA AMP PROB: CPT | Performed by: PHYSICIAN ASSISTANT

## 2024-05-09 NOTE — ED PROVIDER NOTES
Patient Seen in: [facility]      History     Chief Complaint   Patient presents with    Urinary Symptoms    Eval-G     Stated Complaint: Eval G    Subjective:   HPI    23-year-old female presenting for evaluation of urinary frequency for the last 3 weeks.  Patient denies associated abdominal pain.  She denies vaginal discharge, rashes or lesions.  She denies any fevers.  Requesting STI testing.    Objective:   Past Medical History:    Acne    Anemia    Constipation    Depression              Past Surgical History:   Procedure Laterality Date    Colonoscopy N/A 4/19/2021    Procedure: COLONOSCOPY;  Surgeon: Jarek Marroquin MD;  Location: [facility]                Social History     Socioeconomic History    Marital status: Single   Tobacco Use    Smoking status: Never    Smokeless tobacco: Never   Vaping Use    Vaping status: Never Used   Substance and Sexual Activity    Alcohol use: Yes     Alcohol/week: 2.0 standard drinks of alcohol     Types: 2 Standard drinks or equivalent per week     Comment: rarely    Drug use: No   Other Topics Concern    History of tanning No    Outdoor occupation No    Reaction to local anesthetic No   Social History Narrative    ** Merged History Encounter **          Social Determinants of Health      Received from [facility]              Review of Systems    Positive for stated complaint: Eval G  Other systems are as noted in HPI.  Constitutional and vital signs reviewed.      All other systems reviewed and negative except as noted above.    Physical Exam     ED Triage Vitals [05/09/24 1810]   /76   Pulse 82   Resp 18   Temp 99.4 °F (37.4 °C)   Temp src Temporal   SpO2 100 %   O2 Device None (Room air)       Current Vitals:   Vital Signs  BP: 113/76  Pulse: 82  Resp: 18  Temp: 99.4 °F (37.4 °C)  Temp src: Temporal    Oxygen Therapy  SpO2: 100 %  O2 Device: None (Room air)            Physical Exam  Vitals and nursing note reviewed.   Constitutional:        General: She is not in acute distress.  HENT:      Head: Normocephalic and atraumatic.      Right Ear: External ear normal.      Left Ear: External ear normal.      Nose: Nose normal.      Mouth/Throat:      Mouth: Mucous membranes are moist.   Eyes:      Extraocular Movements: Extraocular movements intact.      Pupils: Pupils are equal, round, and reactive to light.   Cardiovascular:      Rate and Rhythm: Normal rate.   Pulmonary:      Effort: Pulmonary effort is normal.   Abdominal:      General: Abdomen is flat.   Genitourinary:     Vagina: Vaginal discharge (scant mucoid vaginal discharge) present.      Cervix: No cervical motion tenderness.   Musculoskeletal:         General: Normal range of motion.      Cervical back: Normal range of motion.   Skin:     General: Skin is warm.   Neurological:      General: No focal deficit present.      Mental Status: She is alert and oriented to person, place, and time.   Psychiatric:         Mood and Affect: Mood normal.         Behavior: Behavior normal.               ED Course     Labs Reviewed   [facility] POCT URINALYSIS DIPSTICK   VAGINITIS VAGINOSIS PCR PANEL   CHLAMYDIA/GONOCOCCUS, HAI        23-year-old female presenting for evaluation of urinary frequency for the last 3 weeks.  Requesting STI testing.  UA negative  Ddx-UTI, STI, yeast infection  GC/chlamydia, vaginosis screen are pending.  Patient advised to refrain from intercourse until the results of these test are known.              MDM                                         Medical Decision Making      Disposition and Plan     Clinical Impression:  1. Urinary frequency    2. Encounter for screening examination for sexually transmitted infection         Disposition:  Discharge  5/9/2024  6:38 pm    Follow-up:  Alexi Hitchcock, DO  [address] [place] [address]  [phone]                Medications Prescribed:  Current Discharge Medication List                                          patient

## 2024-05-10 LAB
BV BACTERIA DNA VAG QL NAA+PROBE: POSITIVE
C GLABRATA DNA VAG QL NAA+PROBE: NEGATIVE
C KRUSEI DNA VAG QL NAA+PROBE: NEGATIVE
C TRACH DNA SPEC QL NAA+PROBE: POSITIVE
CANDIDA DNA VAG QL NAA+PROBE: NEGATIVE
N GONORRHOEA DNA SPEC QL NAA+PROBE: NEGATIVE
T VAGINALIS DNA VAG QL NAA+PROBE: NEGATIVE

## 2024-05-10 RX ORDER — DOXYCYCLINE HYCLATE 100 MG/1
100 CAPSULE ORAL 2 TIMES DAILY
Qty: 14 CAPSULE | Refills: 0 | Status: SHIPPED | OUTPATIENT
Start: 2024-05-10 | End: 2024-05-17

## 2024-05-10 RX ORDER — METRONIDAZOLE 7.5 MG/G
1 GEL VAGINAL NIGHTLY
Qty: 70 G | Refills: 0 | Status: SHIPPED | OUTPATIENT
Start: 2024-05-10 | End: 2024-05-15

## 2024-05-10 NOTE — ED PROVIDER NOTES
Chlamydia test is positive.  BV is positive. Prescriptions for doxycycline and metronidazole were sent to her pharmacy.  I attempted to call the patient, there was no answer, I did leave a voicemail for her to call back.

## 2024-05-29 ENCOUNTER — OFFICE VISIT (OUTPATIENT)
Dept: FAMILY MEDICINE CLINIC | Facility: CLINIC | Age: 24
End: 2024-05-29

## 2024-05-29 VITALS
HEIGHT: 61 IN | HEART RATE: 90 BPM | SYSTOLIC BLOOD PRESSURE: 108 MMHG | WEIGHT: 144 LBS | DIASTOLIC BLOOD PRESSURE: 73 MMHG | BODY MASS INDEX: 27.19 KG/M2

## 2024-05-29 DIAGNOSIS — Z86.19 HISTORY OF CHLAMYDIA INFECTION: Primary | ICD-10-CM

## 2024-05-29 PROCEDURE — 99212 OFFICE O/P EST SF 10 MIN: CPT | Performed by: PHYSICIAN ASSISTANT

## 2024-05-29 PROCEDURE — 3074F SYST BP LT 130 MM HG: CPT | Performed by: PHYSICIAN ASSISTANT

## 2024-05-29 PROCEDURE — 3078F DIAST BP <80 MM HG: CPT | Performed by: PHYSICIAN ASSISTANT

## 2024-05-29 PROCEDURE — 3008F BODY MASS INDEX DOCD: CPT | Performed by: PHYSICIAN ASSISTANT

## 2024-05-29 NOTE — PROGRESS NOTES
HPI:     HPI  A 23- year-old female is here in the office for a follow-up. The patient finished antibiotic for BV. The patient is doing fine at this time. The patient denies fever, vaginal discharge, vaginal itchy or odor, and abdominal pain.    Medications:     Current Outpatient Medications   Medication Sig Dispense Refill    pseudoephedrine  MG Oral Tablet 12 Hr Take 1 tablet (120 mg total) by mouth every 12 (twelve) hours. 60 tablet 3    fluticasone propionate 50 MCG/ACT Nasal Suspension 2 sprays by Nasal route daily. 48 g 3    gabapentin 100 MG Oral Cap       ondansetron (ZOFRAN) 4 mg tablet Take 1 tablet (4 mg total) by mouth every 6 (six) hours as needed.      busPIRone 15 MG Oral Tab       ARIPiprazole 10 MG Oral Tab       FLUoxetine HCl 60 MG Oral Tab Take 60 mg by mouth daily. 30 tablet 0    Norgestim-Eth Estrad Triphasic (TRI-SPRINTEC) 0.18/0.215/0.25 MG-35 MCG Oral Tab Take 1 tablet by mouth daily. 84 tablet 3       Allergies:   No Known Allergies    History:     Health Maintenance   Topic Date Due    Annual Physical  Never done    COVID-19 Vaccine (3 - 2023-24 season) 09/01/2023    Pap Smear  12/22/2023    Annual Depression Screening  01/01/2024    Influenza Vaccine (Season Ended) 10/01/2024    Chlamydia Screening  05/09/2025    DTaP,Tdap,and Td Vaccines (5 - Td or Tdap) 12/22/2030    HPV Vaccines  Completed    Pneumococcal Vaccine: Birth to 64yrs  Aged Out       Patient's last menstrual period was 04/30/2024 (approximate).   Past Medical History:     Past Medical History:    Acne    Anemia    Constipation    Depression       Past Surgical History:     Past Surgical History:   Procedure Laterality Date    Colonoscopy N/A 4/19/2021    Procedure: COLONOSCOPY;  Surgeon: Jarek Marroquin MD;  Location: [facility]       Family History:     Family History   Problem Relation Age of Onset    Diabetes Father        Social History:     Social History     Socioeconomic History    Marital status: Single      Spouse name: Not on file    Number of children: Not on file    Years of education: Not on file    Highest education level: Not on file   Occupational History    Not on file   Tobacco Use    Smoking status: Never    Smokeless tobacco: Never   Vaping Use    Vaping status: Never Used   Substance and Sexual Activity    Alcohol use: Yes     Alcohol/week: 2.0 standard drinks of alcohol     Types: 2 Standard drinks or equivalent per week     Comment: rarely    Drug use: No    Sexual activity: Not on file   Other Topics Concern    Grew up on a farm Not Asked    History of tanning No    Outdoor occupation No    Breast feeding Not Asked    Reaction to local anesthetic No   Social History Narrative    ** Merged History Encounter **          Social Determinants of Health     Financial Resource Strain: Not on file   Food Insecurity: Not on file   Transportation Needs: Not on file   Physical Activity: Not on file   Stress: Not on file   Social Connections: Not on file   Housing Stability: At Risk (8/18/2023)    Received from [facility]     Living Situation: Not on file     Housing Problems: Not on file       Review of Systems:   Review of Systems   Constitutional:  Negative for activity change, chills, fatigue and fever.   HENT:  Negative for congestion, ear discharge, ear pain, postnasal drip, rhinorrhea, sinus pressure, sinus pain and sore throat.    Respiratory:  Negative for cough, chest tightness, shortness of breath and wheezing.    Cardiovascular:  Negative for chest pain and palpitations.   Gastrointestinal:  Negative for abdominal distention, abdominal pain, blood in stool, constipation, diarrhea, nausea and vomiting.   Skin:  Negative for rash.        Vitals:    05/29/24 1433   BP: 108/73   Pulse: 90   Weight: 144 lb (65.3 kg)   Height: 5' 1\" (1.549 m)     Body mass index is 27.21 kg/m².    Physical Exam:   Physical Exam  Vitals reviewed.      Patient alert and orient x3, able to carry on conversation  easily, without shortness of breath, coughing, can speak in full sentences.      Assessment and Plan::     Problem List Items Addressed This Visit    None  Visit Diagnoses       History of chlamydia infection    -  Primary    Relevant Orders    Chlamydia/Gc Amplification          Discussed plan of care with pt and pt is in agreement.All questions answered. Pt to call with questions or concerns.

## 2024-06-10 ENCOUNTER — APPOINTMENT (OUTPATIENT)
Dept: GENERAL RADIOLOGY | Age: 24
End: 2024-06-10
Attending: NURSE PRACTITIONER
Payer: COMMERCIAL

## 2024-06-10 ENCOUNTER — HOSPITAL ENCOUNTER (OUTPATIENT)
Age: 24
Discharge: HOME | End: 2024-06-10
Payer: COMMERCIAL

## 2024-06-10 VITALS
RESPIRATION RATE: 18 BRPM | TEMPERATURE: 98 F | SYSTOLIC BLOOD PRESSURE: 111 MMHG | HEART RATE: 97 BPM | DIASTOLIC BLOOD PRESSURE: 70 MMHG | OXYGEN SATURATION: 99 %

## 2024-06-10 DIAGNOSIS — S90.822A FRICTION BLISTER OF THE FOOT, LEFT, INITIAL ENCOUNTER: Primary | ICD-10-CM

## 2024-06-10 DIAGNOSIS — S90.821A FRICTION BLISTERS OF SOLE OF RIGHT FOOT, INITIAL ENCOUNTER: ICD-10-CM

## 2024-06-10 PROCEDURE — 73660 X-RAY EXAM OF TOE(S): CPT | Performed by: NURSE PRACTITIONER

## 2024-06-10 PROCEDURE — 99213 OFFICE O/P EST LOW 20 MIN: CPT | Performed by: NURSE PRACTITIONER

## 2024-06-10 NOTE — ED INITIAL ASSESSMENT (HPI)
Pt presents with bilateral 5th toe redness/swelling/pain after running 1/2 marathon yesterday. Denies injury/trauma.

## 2024-06-10 NOTE — ED PROVIDER NOTES
Patient Seen in: [facility]      History     Chief Complaint   Patient presents with    Toe Pain     Stated Complaint: Feet/toe pain    Subjective:   Is a 23-year-old female who presents today for bilateral fifth toe swelling and pain along with blisters, that started yesterday.  Reports she ran a 13 mile marathon yesterday.  No trauma or injury.         Objective:   Past Medical History:    Acne    Anemia    Constipation    Depression              Past Surgical History:   Procedure Laterality Date    Colonoscopy N/A 4/19/2021    Procedure: COLONOSCOPY;  Surgeon: Jarek Marroquin MD;  Location: [facility]                Social History     Socioeconomic History    Marital status: Single   Tobacco Use    Smoking status: Never    Smokeless tobacco: Never   Vaping Use    Vaping status: Never Used   Substance and Sexual Activity    Alcohol use: Yes     Alcohol/week: 2.0 standard drinks of alcohol     Types: 2 Standard drinks or equivalent per week     Comment: rarely    Drug use: No   Other Topics Concern    History of tanning No    Outdoor occupation No    Reaction to local anesthetic No   Social History Narrative    ** Merged History Encounter **          Social Determinants of Health      Received from [facility]              Review of Systems   All other systems reviewed and are negative.      Positive for stated complaint: Feet/toe pain  Other systems are as noted in HPI.  Constitutional and vital signs reviewed.      All other systems reviewed and negative except as noted above.    Physical Exam     ED Triage Vitals [06/10/24 0910]   /70   Pulse 97   Resp 18   Temp 98.2 °F (36.8 °C)   Temp src Temporal   SpO2 99 %   O2 Device None (Room air)       Current Vitals:   Vital Signs  BP: 111/70  Pulse: 97  Resp: 18  Temp: 98.2 °F (36.8 °C)  Temp src: Temporal    Oxygen Therapy  SpO2: 99 %  O2 Device: None (Room air)            Physical Exam  Constitutional:       Appearance: Normal  appearance.   Musculoskeletal:      Right foot: Normal range of motion and normal capillary refill. Swelling (5th toe, blister distally) present. Normal pulse.      Left foot: Normal range of motion and normal capillary refill. Swelling (5th toe, blister distally) present. Normal pulse.   Neurological:      Mental Status: She is alert.       ED Course   Labs Reviewed - No data to display    Procedures: Right fifth toe was cleansed with alcohol, pinpoint incision made with 27-gauge needle, and blister drained, area cleansed and covered with antibiotic ointment and Band-Aid. Left fifth toe was cleansed with alcohol, pinpoint incision made with 27-gauge needle, and blister drained, area cleansed and covered with antibiotic ointment and Band-Aid.      MDM       Medical Decision Making  Differentials considered: uncomplicated friction blisters versus toe fracture versus toe sprain versus other    HPI and exam consistent with bilateral uncomplicated friction blisters of the fifth toes.  No fractures noted on bilateral fifth toe x-rays.  The blisters were drained per procedure note.  She was advised to keep the area clean.  Return for any signs of infection.  Patient verbalized understanding and agreeable to plan of care.    Amount and/or Complexity of Data Reviewed  Radiology: ordered and independent interpretation performed. Decision-making details documented in ED Course.     Details: I personally reviewed bilateral fifth toe x-rays: No fracture    Risk  OTC drugs.        Disposition and Plan     Clinical Impression:  1. Friction blister of the foot, left, initial encounter    2. Friction blisters of sole of right foot, initial encounter         Disposition:  Discharge  6/10/2024 10:26 am    Follow-up:  No follow-up provider specified.        Medications Prescribed:  Discharge Medication List as of 6/10/2024 10:27 AM

## 2024-06-12 ENCOUNTER — LAB ENCOUNTER (OUTPATIENT)
Dept: LAB | Age: 24
End: 2024-06-12
Attending: Other
Payer: COMMERCIAL

## 2024-06-12 DIAGNOSIS — D50.9 IRON DEFICIENCY ANEMIA, UNSPECIFIED: Primary | ICD-10-CM

## 2024-06-12 LAB
BASOPHILS # BLD AUTO: 0.03 X10(3) UL (ref 0–0.2)
BASOPHILS NFR BLD AUTO: 0.5 %
DEPRECATED RDW RBC AUTO: 42.2 FL (ref 35.1–46.3)
EOSINOPHIL # BLD AUTO: 0.12 X10(3) UL (ref 0–0.7)
EOSINOPHIL NFR BLD AUTO: 2.1 %
ERYTHROCYTE [DISTWIDTH] IN BLOOD BY AUTOMATED COUNT: 13.4 % (ref 11–15)
HCT VFR BLD AUTO: 38.5 %
HGB BLD-MCNC: 13.1 G/DL
IMM GRANULOCYTES # BLD AUTO: 0.02 X10(3) UL (ref 0–1)
IMM GRANULOCYTES NFR BLD: 0.4 %
LYMPHOCYTES # BLD AUTO: 1.98 X10(3) UL (ref 1–4)
LYMPHOCYTES NFR BLD AUTO: 35.3 %
MCH RBC QN AUTO: 29.6 PG (ref 26–34)
MCHC RBC AUTO-ENTMCNC: 34 G/DL (ref 31–37)
MCV RBC AUTO: 86.9 FL
MONOCYTES # BLD AUTO: 0.51 X10(3) UL (ref 0.1–1)
MONOCYTES NFR BLD AUTO: 9.1 %
NEUTROPHILS # BLD AUTO: 2.95 X10 (3) UL (ref 1.5–7.7)
NEUTROPHILS # BLD AUTO: 2.95 X10(3) UL (ref 1.5–7.7)
NEUTROPHILS NFR BLD AUTO: 52.6 %
PLATELET # BLD AUTO: 269 10(3)UL (ref 150–450)
RBC # BLD AUTO: 4.43 X10(6)UL
WBC # BLD AUTO: 5.6 X10(3) UL (ref 4–11)

## 2024-06-12 PROCEDURE — 36415 COLL VENOUS BLD VENIPUNCTURE: CPT

## 2024-06-12 PROCEDURE — 85025 COMPLETE CBC W/AUTO DIFF WBC: CPT

## 2024-07-05 RX ORDER — IBUPROFEN 800 MG/1
TABLET ORAL
COMMUNITY
Start: 2024-06-03

## 2024-07-11 ENCOUNTER — OFFICE VISIT (OUTPATIENT)
Dept: FAMILY MEDICINE CLINIC | Facility: CLINIC | Age: 24
End: 2024-07-11
Payer: COMMERCIAL

## 2024-07-11 VITALS
OXYGEN SATURATION: 98 % | HEART RATE: 81 BPM | BODY MASS INDEX: 27.38 KG/M2 | HEIGHT: 61 IN | DIASTOLIC BLOOD PRESSURE: 76 MMHG | WEIGHT: 145 LBS | SYSTOLIC BLOOD PRESSURE: 110 MMHG | TEMPERATURE: 97 F

## 2024-07-11 DIAGNOSIS — R11.0 NAUSEA: ICD-10-CM

## 2024-07-11 DIAGNOSIS — Z11.1 SCREENING-PULMONARY TB: ICD-10-CM

## 2024-07-11 DIAGNOSIS — Z01.84 IMMUNITY STATUS TESTING: ICD-10-CM

## 2024-07-11 DIAGNOSIS — Z00.00 ENCOUNTER FOR WELLNESS EXAMINATION IN ADULT: Primary | ICD-10-CM

## 2024-07-11 DIAGNOSIS — R06.83 SNORING: ICD-10-CM

## 2024-07-11 DIAGNOSIS — F32.2 SEVERE MAJOR DEPRESSION, SINGLE EPISODE, WITHOUT PSYCHOTIC FEATURES (HCC): ICD-10-CM

## 2024-07-11 DIAGNOSIS — R40.0 DAYTIME SOMNOLENCE: ICD-10-CM

## 2024-07-11 DIAGNOSIS — G47.33 OSA (OBSTRUCTIVE SLEEP APNEA): ICD-10-CM

## 2024-07-11 PROCEDURE — 3008F BODY MASS INDEX DOCD: CPT

## 2024-07-11 PROCEDURE — 99395 PREV VISIT EST AGE 18-39: CPT

## 2024-07-11 PROCEDURE — 3078F DIAST BP <80 MM HG: CPT

## 2024-07-11 PROCEDURE — 99213 OFFICE O/P EST LOW 20 MIN: CPT

## 2024-07-11 PROCEDURE — 3074F SYST BP LT 130 MM HG: CPT

## 2024-07-11 RX ORDER — LAMOTRIGINE 25 MG/1
TABLET ORAL
COMMUNITY
Start: 2024-06-19

## 2024-07-11 NOTE — PROGRESS NOTES
Subjective:   Jose Alberto Ferreira is a 23 year old female who presents for Physical (Lab orders, nursing physical )   Patient is a pleasant 23-year-old female past medical history consistent for acne, rosacea, constipation, and depression with SI attempt.  Patient presents to office today for physical.  Patient is new to this office and new to this provider.  Patient states her health is pretty good.    Bachelors in psychology from [employer] wants to do accelerated masters for psych nursing.    Diet: Wants to cook more at home. Eats out every other day. Sandwiches and soup.   Exercise: Runner, runs 3-5 times per week. Did half marathon last month.   Sleep: Good, but sleeps a lot. Deeply and has trouble waking up in am   Stress: Not much. Likes to run and talk it out.   Social: Dating, one boy, lives with mom in [place]  Sexually Active: yes  Prophylaxis: has ocps but bad at taking.   Alcohol: occasionally, once per month. Mixed drinks  Tobacco: No, tried in college.   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   Vaccinations: UTD.   PAP: Will schedule with OB.  PHQ9 Score: 14  Menses 28 days for 5 days.  What            Past Medical History:    Acne    Anemia    Constipation    Depression      Past Surgical History:   Procedure Laterality Date    Colonoscopy N/A 4/19/2021    Procedure: COLONOSCOPY;  Surgeon: Jarek Marroquin MD;  Location: [facility]        History/Other:    Chief Complaint Reviewed and Verified  Nursing Notes Reviewed and   Verified  Tobacco Reviewed  Allergies Reviewed  Medications Reviewed    Problem List Reviewed  Medical History Reviewed  Surgical History   Reviewed  OB Status Reviewed  Family History Reviewed  Social History   Reviewed         Tobacco:  She has never smoked tobacco.    Current Outpatient Medications   Medication Sig Dispense Refill    lamoTRIgine 25 MG Oral Tab TAKE 1 TABLET BY MOUTH NIGHTLY FOR 2 WEEKS, THEN AS TOLERATED INCREASE TO 2 TABLETS NIGHTLY      ibuprofen  800 MG Oral Tab       pseudoephedrine  MG Oral Tablet 12 Hr Take 1 tablet (120 mg total) by mouth every 12 (twelve) hours. 60 tablet 3    fluticasone propionate 50 MCG/ACT Nasal Suspension 2 sprays by Nasal route daily. 48 g 3    gabapentin 100 MG Oral Cap       ondansetron (ZOFRAN) 4 mg tablet Take 1 tablet (4 mg total) by mouth every 6 (six) hours as needed.      busPIRone 15 MG Oral Tab       FLUoxetine HCl 60 MG Oral Tab Take 60 mg by mouth daily. 30 tablet 0    Norgestim-Eth Estrad Triphasic (TRI-SPRINTEC) 0.18/0.215/0.25 MG-35 MCG Oral Tab Take 1 tablet by mouth daily. 84 tablet 3    ARIPiprazole 10 MG Oral Tab  (Patient not taking: Reported on 7/11/2024)           Review of Systems:  Review of Systems   Constitutional:  Positive for fatigue. Negative for activity change, appetite change, chills and fever.   HENT: Negative.  Negative for congestion, postnasal drip, rhinorrhea, sore throat, tinnitus and voice change.    Eyes: Negative.    Respiratory: Negative.  Negative for apnea, cough, chest tightness and shortness of breath.    Cardiovascular:  Negative for chest pain and leg swelling.   Gastrointestinal:  Positive for nausea. Negative for abdominal pain, anal bleeding, blood in stool, constipation, diarrhea and vomiting.   Genitourinary: Negative.  Negative for difficulty urinating, flank pain and menstrual problem.   Musculoskeletal: Negative.  Negative for joint swelling.   Skin: Negative.    Neurological: Negative.  Negative for dizziness and headaches.   Psychiatric/Behavioral: Negative.  Negative for agitation, self-injury and suicidal ideas.          Objective:   /76 (BP Location: Right arm, Patient Position: Sitting, Cuff Size: adult)   Pulse 81   Temp 96.8 °F (36 °C)   Ht 5' 1\" (1.549 m)   Wt 145 lb (65.8 kg)   LMP 07/04/2024 (Approximate)   SpO2 98%   BMI 27.40 kg/m²  Estimated body mass index is 27.4 kg/m² as calculated from the following:    Height as of this encounter: 5' 1\"  (1.549 m).    Weight as of this encounter: 145 lb (65.8 kg).  Physical Exam  Vitals and nursing note reviewed.   Constitutional:       General: She is not in acute distress.     Appearance: Normal appearance. She is well-developed and normal weight.   HENT:      Head: Normocephalic and atraumatic.      Right Ear: Tympanic membrane, ear canal and external ear normal. There is no impacted cerumen.      Left Ear: Tympanic membrane, ear canal and external ear normal. There is no impacted cerumen.      Nose: Nose normal. No congestion or rhinorrhea.      Mouth/Throat:      Mouth: Mucous membranes are moist.      Pharynx: Oropharynx is clear.      Comments: Narrow oropharynx  Eyes:      Conjunctiva/sclera: Conjunctivae normal.      Pupils: Pupils are equal, round, and reactive to light.   Neck:      Thyroid: No thyromegaly.   Cardiovascular:      Rate and Rhythm: Normal rate and regular rhythm.      Pulses: Normal pulses.      Heart sounds: Normal heart sounds. No murmur heard.  Pulmonary:      Effort: Pulmonary effort is normal. No respiratory distress.      Breath sounds: Normal breath sounds. No wheezing or rales.   Chest:      Chest wall: No tenderness.   Abdominal:      General: Bowel sounds are normal. There is no distension.      Palpations: Abdomen is soft.      Tenderness: There is no abdominal tenderness.   Musculoskeletal:         General: No tenderness. Normal range of motion.      Cervical back: Normal range of motion and neck supple.   Lymphadenopathy:      Cervical: No cervical adenopathy.   Skin:     General: Skin is warm and dry.      Capillary Refill: Capillary refill takes less than 2 seconds.      Findings: No rash.   Neurological:      Mental Status: She is alert and oriented to person, place, and time.      Coordination: Coordination normal.   Psychiatric:         Behavior: Behavior normal.         Thought Content: Thought content normal.         Judgment: Judgment normal.           Assessment &  Plan:   1. Encounter for wellness examination in adult (Primary)  -     Hemoglobin A1C; Future; Expected date: 07/11/2024  -     CBC With Differential With Platelet; Future; Expected date: 07/11/2024  -     Comp Metabolic Panel (14); Future; Expected date: 07/11/2024  -     TSH W Reflex To Free T4; Future; Expected date: 07/11/2024  -     Vitamin D; Future; Expected date: 07/11/2024  2. Immunity status testing  -     Varicella Zoster, IGG; Future; Expected date: 07/11/2024  -     Rubeola(Measles)Antibodies, IGG-Immunity; Future; Expected date: 07/11/2024  -     Mumps Antibodies, IGG-Immunity; Future; Expected date: 07/11/2024  -     Rubella, IGG; Future; Expected date: 07/11/2024  -     Hepatitis B Profile; Future; Expected date: 07/11/2024  3. Daytime somnolence  -     Vitamin D; Future; Expected date: 07/11/2024  -     Diagnostic Sleep Study  -     General sleep study; Future; Expected date: 08/11/2024  4. Snoring  -     Diagnostic Sleep Study  -     General sleep study; Future; Expected date: 08/11/2024  5. RUBY (obstructive sleep apnea)  -     Diagnostic Sleep Study  -     General sleep study; Future; Expected date: 08/11/2024  6. Screening-pulmonary TB  -     Quantiferon TB Plus; Future; Expected date: 07/11/2024  7. Nausea  8. Severe major depression, single episode, without psychotic features (HCC)  Wellness labs ordered.  Encouraged increasing physical activity which includes raising heart rate 3 to 5 days/week for at least 30 minutes at a time, while also performing mild strength exercises.  Patient counseled on importance of dietary modifications, which may include limiting fats, red meat, and carbohydrates, while increasing fruit and vegetable intake, and trying to adhere to a low sodium/Mediterranean diet.  Encouraged to manage stress.  Encouraged good sleep habits.  Encouraged good sexual health.    Needs titers for school. Will submit form via fax.    Reports issues with fatigue and daytime somnolence,  loud snoring and periods of apnea. Will obtain diagnostic sleep study and vitamin D.   Educated to avoid alcohol and sedating medications.  Educated to avoid driving while drowsy.  Await test results    Has some nausea secondary to psych meds.  PHQ-9 score 8 denies SI/HI.  Regularly sees therapist.  Stable on medications.  Advised to take medications at night to avoid nausea.     Return if symptoms worsen or fail to improve.    Jarek Collazo, JADEN, 7/11/2024, 9:00 AM

## 2024-07-13 ENCOUNTER — LAB ENCOUNTER (OUTPATIENT)
Dept: LAB | Age: 24
End: 2024-07-13
Payer: COMMERCIAL

## 2024-07-13 DIAGNOSIS — Z01.84 IMMUNITY STATUS TESTING: ICD-10-CM

## 2024-07-13 DIAGNOSIS — Z00.00 ENCOUNTER FOR WELLNESS EXAMINATION IN ADULT: ICD-10-CM

## 2024-07-13 DIAGNOSIS — Z11.1 SCREENING-PULMONARY TB: ICD-10-CM

## 2024-07-13 DIAGNOSIS — Z86.19 HISTORY OF CHLAMYDIA INFECTION: ICD-10-CM

## 2024-07-13 DIAGNOSIS — R40.0 DAYTIME SOMNOLENCE: ICD-10-CM

## 2024-07-13 LAB
ALBUMIN SERPL-MCNC: 4.3 G/DL (ref 3.2–4.8)
ALBUMIN/GLOB SERPL: 1.6 {RATIO} (ref 1–2)
ALP LIVER SERPL-CCNC: 76 U/L
ALT SERPL-CCNC: 8 U/L
ANION GAP SERPL CALC-SCNC: 9 MMOL/L (ref 0–18)
AST SERPL-CCNC: 18 U/L (ref ?–34)
BASOPHILS # BLD AUTO: 0.06 X10(3) UL (ref 0–0.2)
BASOPHILS NFR BLD AUTO: 0.8 %
BILIRUB SERPL-MCNC: 0.7 MG/DL (ref 0.3–1.2)
BUN BLD-MCNC: 13 MG/DL (ref 9–23)
BUN/CREAT SERPL: 17.1 (ref 10–20)
CALCIUM BLD-MCNC: 9.4 MG/DL (ref 8.7–10.4)
CHLORIDE SERPL-SCNC: 108 MMOL/L (ref 98–112)
CO2 SERPL-SCNC: 24 MMOL/L (ref 21–32)
CREAT BLD-MCNC: 0.76 MG/DL
DEPRECATED RDW RBC AUTO: 41 FL (ref 35.1–46.3)
EGFRCR SERPLBLD CKD-EPI 2021: 112 ML/MIN/1.73M2 (ref 60–?)
EOSINOPHIL # BLD AUTO: 0.09 X10(3) UL (ref 0–0.7)
EOSINOPHIL NFR BLD AUTO: 1.3 %
ERYTHROCYTE [DISTWIDTH] IN BLOOD BY AUTOMATED COUNT: 12.8 % (ref 11–15)
EST. AVERAGE GLUCOSE BLD GHB EST-MCNC: 103 MG/DL (ref 68–126)
FASTING STATUS PATIENT QL REPORTED: YES
GLOBULIN PLAS-MCNC: 2.7 G/DL (ref 2–3.5)
GLUCOSE BLD-MCNC: 92 MG/DL (ref 70–99)
HBA1C MFR BLD: 5.2 % (ref ?–5.7)
HBV CORE AB SERPL QL IA: NONREACTIVE
HBV SURFACE AB SER QL: REACTIVE
HBV SURFACE AB SERPL IA-ACNC: 45.68 MIU/ML
HBV SURFACE AG SER-ACNC: 0.12 [IU]/L
HBV SURFACE AG SERPL QL IA: NONREACTIVE
HCT VFR BLD AUTO: 42.1 %
HGB BLD-MCNC: 14.1 G/DL
IMM GRANULOCYTES # BLD AUTO: 0.02 X10(3) UL (ref 0–1)
IMM GRANULOCYTES NFR BLD: 0.3 %
LYMPHOCYTES # BLD AUTO: 1.93 X10(3) UL (ref 1–4)
LYMPHOCYTES NFR BLD AUTO: 27.1 %
MCH RBC QN AUTO: 29.4 PG (ref 26–34)
MCHC RBC AUTO-ENTMCNC: 33.5 G/DL (ref 31–37)
MCV RBC AUTO: 87.7 FL
MONOCYTES # BLD AUTO: 0.47 X10(3) UL (ref 0.1–1)
MONOCYTES NFR BLD AUTO: 6.6 %
NEUTROPHILS # BLD AUTO: 4.54 X10 (3) UL (ref 1.5–7.7)
NEUTROPHILS # BLD AUTO: 4.54 X10(3) UL (ref 1.5–7.7)
NEUTROPHILS NFR BLD AUTO: 63.9 %
OSMOLALITY SERPL CALC.SUM OF ELEC: 292 MOSM/KG (ref 275–295)
PLATELET # BLD AUTO: 282 10(3)UL (ref 150–450)
POTASSIUM SERPL-SCNC: 4.7 MMOL/L (ref 3.5–5.1)
PROT SERPL-MCNC: 7 G/DL (ref 5.7–8.2)
RBC # BLD AUTO: 4.8 X10(6)UL
RUBV IGG SER QL: POSITIVE
RUBV IGG SER-ACNC: 49 IU/ML (ref 10–?)
SODIUM SERPL-SCNC: 141 MMOL/L (ref 136–145)
TSI SER-ACNC: 2.1 MIU/ML (ref 0.55–4.78)
VIT D+METAB SERPL-MCNC: 18.6 NG/ML (ref 30–100)
WBC # BLD AUTO: 7.1 X10(3) UL (ref 4–11)

## 2024-07-13 PROCEDURE — 82306 VITAMIN D 25 HYDROXY: CPT

## 2024-07-13 PROCEDURE — 80503 PATH CLIN CONSLTJ SF 5-20: CPT

## 2024-07-13 PROCEDURE — 80053 COMPREHEN METABOLIC PANEL: CPT

## 2024-07-13 PROCEDURE — 85025 COMPLETE CBC W/AUTO DIFF WBC: CPT

## 2024-07-13 PROCEDURE — 86765 RUBEOLA ANTIBODY: CPT

## 2024-07-13 PROCEDURE — 86706 HEP B SURFACE ANTIBODY: CPT

## 2024-07-13 PROCEDURE — 36415 COLL VENOUS BLD VENIPUNCTURE: CPT

## 2024-07-13 PROCEDURE — 86704 HEP B CORE ANTIBODY TOTAL: CPT

## 2024-07-13 PROCEDURE — 86787 VARICELLA-ZOSTER ANTIBODY: CPT

## 2024-07-13 PROCEDURE — 87491 CHLMYD TRACH DNA AMP PROBE: CPT

## 2024-07-13 PROCEDURE — 87340 HEPATITIS B SURFACE AG IA: CPT

## 2024-07-13 PROCEDURE — 83036 HEMOGLOBIN GLYCOSYLATED A1C: CPT

## 2024-07-13 PROCEDURE — 86480 TB TEST CELL IMMUN MEASURE: CPT

## 2024-07-13 PROCEDURE — 87591 N.GONORRHOEAE DNA AMP PROB: CPT

## 2024-07-13 PROCEDURE — 86735 MUMPS ANTIBODY: CPT

## 2024-07-13 PROCEDURE — 84443 ASSAY THYROID STIM HORMONE: CPT

## 2024-07-13 PROCEDURE — 86762 RUBELLA ANTIBODY: CPT

## 2024-07-14 DIAGNOSIS — E55.9 VITAMIN D DEFICIENCY: Primary | ICD-10-CM

## 2024-07-14 RX ORDER — ERGOCALCIFEROL 1.25 MG/1
50000 CAPSULE ORAL WEEKLY
Qty: 12 CAPSULE | Refills: 0 | Status: SHIPPED | OUTPATIENT
Start: 2024-07-14 | End: 2024-09-30

## 2024-07-14 NOTE — PROGRESS NOTES
1. Vitamin D deficiency  Patient with fatigue on wellness exam.  Vitamin D level added to wellness labs.  Vitamin D deficient at 18.6.  Start ergocalciferol once weekly x 12 weeks. Reassess need following. No refill needed.   - ergocalciferol 1.25 MG (71326 UT) Oral Cap; Take 1 capsule (50,000 Units total) by mouth once a week for 12 doses.  Dispense: 12 capsule; Refill: 0    JADEN Morgan

## 2024-07-15 DIAGNOSIS — Z01.84 IMMUNITY STATUS TESTING: Primary | ICD-10-CM

## 2024-07-15 LAB
C TRACH DNA SPEC QL NAA+PROBE: NEGATIVE
M TB IFN-G CD4+ T-CELLS BLD-ACNC: 0 IU/ML
M TB TUBERC IFN-G BLD QL: NEGATIVE
M TB TUBERC IGNF/MITOGEN IGNF CONTROL: >10 IU/ML
MEV IGG SER-ACNC: 15.8 AU/ML (ref 16.5–?)
N GONORRHOEA DNA SPEC QL NAA+PROBE: NEGATIVE
QFT TB1 AG MINUS NIL: 0.01 IU/ML
QFT TB2 AG MINUS NIL: 0.01 IU/ML
VZV IGG SER IA-ACNC: 1592 (ref 165–?)

## 2024-07-17 LAB — MUV IGG SER IA-ACNC: 6.3 AU/ML (ref 11–?)

## 2024-07-17 RX ORDER — NORGESTIMATE AND ETHINYL ESTRADIOL 7DAYSX3 28
1 KIT ORAL DAILY
Qty: 84 TABLET | Refills: 3 | OUTPATIENT
Start: 2024-07-17

## 2024-07-18 ENCOUNTER — PATIENT MESSAGE (OUTPATIENT)
Dept: FAMILY MEDICINE CLINIC | Facility: CLINIC | Age: 24
End: 2024-07-18

## 2024-07-23 ENCOUNTER — ORDER TRANSCRIPTION (OUTPATIENT)
Dept: SLEEP CENTER | Age: 24
End: 2024-07-23

## 2024-07-23 ENCOUNTER — MED REC SCAN ONLY (OUTPATIENT)
Dept: FAMILY MEDICINE CLINIC | Facility: CLINIC | Age: 24
End: 2024-07-23

## 2024-07-25 ENCOUNTER — NURSE ONLY (OUTPATIENT)
Dept: FAMILY MEDICINE CLINIC | Facility: CLINIC | Age: 24
End: 2024-07-25
Payer: COMMERCIAL

## 2024-07-25 DIAGNOSIS — Z23 NEED FOR MMR VACCINE: Primary | ICD-10-CM

## 2024-07-25 PROCEDURE — 90707 MMR VACCINE SC: CPT

## 2024-07-25 PROCEDURE — 90471 IMMUNIZATION ADMIN: CPT

## 2024-07-25 NOTE — PROGRESS NOTES
Patient is here for her MMR booster vaccine. I verified full name, and reason for nurse visit.     Patient tolerated injection well.

## 2024-08-08 ENCOUNTER — TELEPHONE (OUTPATIENT)
Dept: FAMILY MEDICINE CLINIC | Facility: CLINIC | Age: 24
End: 2024-08-08

## 2024-08-08 NOTE — TELEPHONE ENCOUNTER
Mavis can you please call patient and let her know insurance will not cover sleep study at sleep center. They need a home sleep study first. Order has been written for home sleep study. She can  supplies from [facility] office and schedule. Thanks

## 2024-08-08 NOTE — TELEPHONE ENCOUNTER
8/08 L/ to call back      El seguro no cubrirá el estudio del sueño en el centro del sueño. Daysi necesitan un estudio del sueño en casa. Se oneill redactado la orden para el estudio del sueño en casa. Puede recoger suministros en la oficina de [place] y programar. Leelee

## 2024-08-16 ENCOUNTER — TELEPHONE (OUTPATIENT)
Dept: FAMILY MEDICINE CLINIC | Facility: CLINIC | Age: 24
End: 2024-08-16

## 2025-01-09 RX ORDER — NORGESTIMATE AND ETHINYL ESTRADIOL 7DAYSX3 28
1 KIT ORAL DAILY
Qty: 84 TABLET | Refills: 3 | OUTPATIENT
Start: 2025-01-09

## 2025-01-09 NOTE — TELEPHONE ENCOUNTER
Please review;  [facility] protocol failed/ No protocol     Last PAP   hinPrep PAP with HPV Reflex Request: Patient Communication     Append Comments   Seen    Pap is normal. Repeat in 3 years.   Written by Becka Cruz CMA on 12/29/2020  5:17 PM CST  Seen by patient Jose Alberto Ferreira on 10/4/2024 12:52 AM      Requested Prescriptions   Pending Prescriptions Disp Refills    Norgestim-Eth Estrad Triphasic (TRI-SPRINTEC) 0.18/0.215/0.25 MG-35 MCG Oral Tab 84 tablet 3     Sig: Take 1 tablet by mouth daily.       Gynecology Medication Protocol Passed - 1/9/2025  8:10 AM        Passed - PASS-PENDING LAST PAP WNL--VIA MANUAL LOOKUP        Passed - Medication is active on med list        Passed - Physical or Pelvic/Breast in past 12 or next 3 mos--VIA MANUAL LOOKUP             Recent Outpatient Visits              5 months ago Need for MMR vaccine    [facility], Kishan    Nurse Only    6 months ago [facility] wellness examination [facility]    Office Visit    7 months ago History of chlamydia infection    [facility] [place], Minhxuyen, PA-C    Office Visit    8 months ago 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, Diana Giles Au.D    Office Visit    8 months ago Hearing loss, unspecified hearing loss type, unspecified laterality    [facility]    [facility] Visit

## 2025-01-22 NOTE — TELEPHONE ENCOUNTER
Per patient she needs a refill on her Norgestim-Eth Estrad Triphasic (TRI-SPRINTEC) and patient is out of medication.  Please send to her pharmacy [facility]/ Go on file verified.    Current Outpatient Medications   Medication Sig Dispense Refill                                                                   Norgestim-Eth Estrad Triphasic (TRI-SPRINTEC) 0.18/0.215/0.25 MG-35 MCG Oral Tab Take 1 tablet by mouth daily. 84 tablet 3

## 2025-01-23 RX ORDER — NORGESTIMATE AND ETHINYL ESTRADIOL 7DAYSX3 28
1 KIT ORAL DAILY
Qty: 84 TABLET | Refills: 0 | Status: SHIPPED | OUTPATIENT
Start: 2025-01-23

## 2025-01-23 NOTE — TELEPHONE ENCOUNTER
Jason Shoemaker, please advise    Patient does not have current health insurance and is trying to get on medicaid at this time. Would you be able to send a 3 month supply for BCP while she sorts out her health insurance?    Called Patient (name and  verified) to relay message that Appt is required for PAP test. Patient states that she is not currently insured with health insurance.

## 2025-01-23 NOTE — TELEPHONE ENCOUNTER
Noted refill request from 01/08/25 was refused because patient is overdue on her appt and last PAP test.    Left message to call back.  [employer] message was sent.      Please review;  [facility] protocol failed/ No protocol      Last PAP   hinPrep PAP with HPV Reflex Request: Patient Communication          Append Comments   Seen    Pap is normal. Repeat in 3 years.   Written by Becka Cruz CMA on 12/29/2020  5:17 PM CST  Seen by patient Jose Alberto Ferreira on 10/4/2024 12:52 AM

## 2025-08-21 ENCOUNTER — LAB ENCOUNTER (OUTPATIENT)
Dept: LAB | Age: 25
End: 2025-08-21
Attending: PHYSICIAN ASSISTANT

## 2025-08-21 ENCOUNTER — OFFICE VISIT (OUTPATIENT)
Dept: FAMILY MEDICINE CLINIC | Facility: CLINIC | Age: 25
End: 2025-08-21

## 2025-08-21 VITALS
HEART RATE: 83 BPM | HEIGHT: 61 IN | SYSTOLIC BLOOD PRESSURE: 111 MMHG | DIASTOLIC BLOOD PRESSURE: 80 MMHG | WEIGHT: 171 LBS | BODY MASS INDEX: 32.28 KG/M2

## 2025-08-21 DIAGNOSIS — Z00.00 WELLNESS EXAMINATION: Primary | ICD-10-CM

## 2025-08-21 DIAGNOSIS — R06.02 SHORTNESS OF BREATH: ICD-10-CM

## 2025-08-21 DIAGNOSIS — Z01.419 ENCOUNTER FOR ROUTINE GYNECOLOGICAL EXAMINATION WITH PAPANICOLAOU SMEAR OF CERVIX: ICD-10-CM

## 2025-08-21 DIAGNOSIS — Z00.00 WELLNESS EXAMINATION: ICD-10-CM

## 2025-08-21 DIAGNOSIS — N89.8 VAGINAL DISCHARGE: ICD-10-CM

## 2025-08-21 LAB
ALBUMIN SERPL-MCNC: 4.4 G/DL (ref 3.2–4.8)
ALBUMIN/GLOB SERPL: 1.8 (ref 1–2)
ALP LIVER SERPL-CCNC: 70 U/L (ref 37–98)
ALT SERPL-CCNC: 12 U/L (ref 10–49)
ANION GAP SERPL CALC-SCNC: 10 MMOL/L (ref 0–18)
AST SERPL-CCNC: 17 U/L (ref ?–34)
BASOPHILS # BLD AUTO: 0.03 X10(3) UL (ref 0–0.2)
BASOPHILS NFR BLD AUTO: 0.5 %
BILIRUB SERPL-MCNC: 0.2 MG/DL (ref 0.3–1.2)
BUN BLD-MCNC: 7 MG/DL (ref 9–23)
BUN/CREAT SERPL: 9.5 (ref 10–20)
CALCIUM BLD-MCNC: 9.1 MG/DL (ref 8.7–10.4)
CHLORIDE SERPL-SCNC: 103 MMOL/L (ref 98–112)
CHOLEST SERPL-MCNC: 147 MG/DL (ref ?–200)
CO2 SERPL-SCNC: 26 MMOL/L (ref 21–32)
CREAT BLD-MCNC: 0.74 MG/DL (ref 0.55–1.02)
DEPRECATED RDW RBC AUTO: 43 FL (ref 35.1–46.3)
EGFRCR SERPLBLD CKD-EPI 2021: 115 ML/MIN/1.73M2 (ref 60–?)
EOSINOPHIL # BLD AUTO: 0.09 X10(3) UL (ref 0–0.7)
EOSINOPHIL NFR BLD AUTO: 1.6 %
ERYTHROCYTE [DISTWIDTH] IN BLOOD BY AUTOMATED COUNT: 13.9 % (ref 11–15)
FASTING PATIENT LIPID ANSWER: NO
FASTING STATUS PATIENT QL REPORTED: NO
GLOBULIN PLAS-MCNC: 2.4 G/DL (ref 2–3.5)
GLUCOSE BLD-MCNC: 92 MG/DL (ref 70–99)
HCT VFR BLD AUTO: 40.2 % (ref 35–48)
HDLC SERPL-MCNC: 66 MG/DL (ref 40–59)
HGB BLD-MCNC: 13.2 G/DL (ref 12–16)
IMM GRANULOCYTES # BLD AUTO: 0.01 X10(3) UL (ref 0–1)
IMM GRANULOCYTES NFR BLD: 0.2 %
LDLC SERPL CALC-MCNC: 69 MG/DL (ref ?–100)
LYMPHOCYTES # BLD AUTO: 1.59 X10(3) UL (ref 1–4)
LYMPHOCYTES NFR BLD AUTO: 28.6 %
MCH RBC QN AUTO: 27.7 PG (ref 26–34)
MCHC RBC AUTO-ENTMCNC: 32.8 G/DL (ref 31–37)
MCV RBC AUTO: 84.3 FL (ref 80–100)
MONOCYTES # BLD AUTO: 0.75 X10(3) UL (ref 0.1–1)
MONOCYTES NFR BLD AUTO: 13.5 %
NEUTROPHILS # BLD AUTO: 3.09 X10 (3) UL (ref 1.5–7.7)
NEUTROPHILS # BLD AUTO: 3.09 X10(3) UL (ref 1.5–7.7)
NEUTROPHILS NFR BLD AUTO: 55.6 %
NONHDLC SERPL-MCNC: 81 MG/DL (ref ?–130)
OSMOLALITY SERPL CALC.SUM OF ELEC: 286 MOSM/KG (ref 275–295)
PLATELET # BLD AUTO: 226 10(3)UL (ref 150–450)
POTASSIUM SERPL-SCNC: 4 MMOL/L (ref 3.5–5.1)
PROT SERPL-MCNC: 6.8 G/DL (ref 5.7–8.2)
RBC # BLD AUTO: 4.77 X10(6)UL (ref 3.8–5.3)
SODIUM SERPL-SCNC: 139 MMOL/L (ref 136–145)
TRIGL SERPL-MCNC: 58 MG/DL (ref 30–149)
TSI SER-ACNC: 1.74 UIU/ML (ref 0.55–4.78)
VLDLC SERPL CALC-MCNC: 9 MG/DL (ref 0–30)
WBC # BLD AUTO: 5.6 X10(3) UL (ref 4–11)

## 2025-08-21 PROCEDURE — 81514 NFCT DS BV&VAGINITIS DNA ALG: CPT | Performed by: PHYSICIAN ASSISTANT

## 2025-08-21 PROCEDURE — 87591 N.GONORRHOEAE DNA AMP PROB: CPT | Performed by: PHYSICIAN ASSISTANT

## 2025-08-21 PROCEDURE — 87491 CHLMYD TRACH DNA AMP PROBE: CPT | Performed by: PHYSICIAN ASSISTANT

## 2025-08-21 PROCEDURE — 87624 HPV HI-RISK TYP POOLED RSLT: CPT | Performed by: PHYSICIAN ASSISTANT

## 2025-08-21 PROCEDURE — 84443 ASSAY THYROID STIM HORMONE: CPT

## 2025-08-21 PROCEDURE — 80061 LIPID PANEL: CPT

## 2025-08-21 PROCEDURE — 36415 COLL VENOUS BLD VENIPUNCTURE: CPT

## 2025-08-21 PROCEDURE — 80053 COMPREHEN METABOLIC PANEL: CPT

## 2025-08-21 PROCEDURE — 85025 COMPLETE CBC W/AUTO DIFF WBC: CPT

## 2025-08-21 RX ORDER — ALBUTEROL SULFATE 90 UG/1
2 INHALANT RESPIRATORY (INHALATION) EVERY 4 HOURS PRN
Qty: 18 G | Refills: 0 | Status: SHIPPED | OUTPATIENT
Start: 2025-08-21

## 2025-08-21 RX ORDER — NORETHINDRONE 0.35 MG
KIT ORAL
COMMUNITY
Start: 2025-07-07

## 2025-08-21 RX ORDER — PROPRANOLOL HYDROCHLORIDE 10 MG/1
TABLET ORAL
COMMUNITY
Start: 2025-07-09

## 2025-08-22 LAB
BV BACTERIA DNA VAG QL NAA+PROBE: NEGATIVE
C GLABRATA DNA VAG QL NAA+PROBE: NEGATIVE
C KRUSEI DNA VAG QL NAA+PROBE: NEGATIVE
C TRACH DNA SPEC QL NAA+PROBE: NEGATIVE
CANDIDA DNA VAG QL NAA+PROBE: POSITIVE
N GONORRHOEA DNA SPEC QL NAA+PROBE: NEGATIVE
T VAGINALIS DNA VAG QL NAA+PROBE: NEGATIVE

## 2025-08-26 LAB — HPV E6+E7 MRNA CVX QL NAA+PROBE: NEGATIVE

## (undated) DEVICE — CLIP LGT 11MM OPEN 2.8MM 235CM

## (undated) DEVICE — Device: Brand: CUSTOM PROCEDURE KIT

## (undated) DEVICE — FORCEP RADIAL JAW 4

## (undated) DEVICE — 35 ML SYRINGE REGULAR TIP: Brand: MONOJECT

## (undated) DEVICE — CONMED SCOPE SAVER BITE BLOCK, 20X27 MM: Brand: SCOPE SAVER

## (undated) DEVICE — LINE MNTR ADLT SET O2 INTMD

## (undated) DEVICE — Device: Brand: DEFENDO AIR/WATER/SUCTION AND BIOPSY VALVE

## (undated) DEVICE — MEDI-VAC NON-CONDUCTIVE SUCTION TUBING 6MM X 1.8M (6FT.) L: Brand: CARDINAL HEALTH

## (undated) NOTE — LETTER
07/13/21        [address] Koki ANDRE  [facility] [address]      Dear Sylvain Mcneal records indicate that you have outstanding lab work and or testing that was ordered for you and has not yet been completed:  US ABDOMEN COMPLETE       To

## (undated) NOTE — ED AVS SNAPSHOT
Breanna Lozada   MRN: [id number]    Department:  [facility] Emergency Department   Date of Visit:  1/12/2019           Disclosure     Insurance plans vary and the physician(s) referred by the ER may not be covered by your plan.  Please contact CARE PHYSICIAN AT ONCE OR RETURN IMMEDIATELY TO THE EMERGENCY DEPARTMENT. If you have been prescribed any medication(s), please fill your prescription right away and begin taking the medication(s) as directed.   If you believe that any of the medications

## (undated) NOTE — ED AVS SNAPSHOT
Sanchez Kim   MRN: [id number]    Department:  [facility] Emergency Department   Date of Visit:  1/9/2019           Disclosure     Insurance plans vary and the physician(s) referred by the ER may not be covered by your plan.  Please contact CARE PHYSICIAN AT ONCE OR RETURN IMMEDIATELY TO THE EMERGENCY DEPARTMENT. If you have been prescribed any medication(s), please fill your prescription right away and begin taking the medication(s) as directed.   If you believe that any of the medications

## (undated) NOTE — ED AVS SNAPSHOT
Suhas Peralta   MRN: [id number]    Department:  [facility] Emergency Department   Date of Visit:  1/10/2019           Disclosure     Insurance plans vary and the physician(s) referred by the ER may not be covered by your plan.  Please contact CARE PHYSICIAN AT ONCE OR RETURN IMMEDIATELY TO THE EMERGENCY DEPARTMENT. If you have been prescribed any medication(s), please fill your prescription right away and begin taking the medication(s) as directed.   If you believe that any of the medications

## (undated) NOTE — LETTER
03/22/21        [address] Koki ANDRE  [facility] [address]      Dear Nguyễn Billing records indicate that you have outstanding lab work and or testing that was ordered for you and has not yet been completed:  Orders Placed This Encounter

## (undated) NOTE — IP AVS SNAPSHOT
[facility]            (For Outpatient Use Only) Initial Admit Date: 10/3/2021   Inpt/Obs Admit Date: Inpt: 10/3/21 / Obs: N/A   Discharge Date:    Juana Zurita:  [de-identified]   MRN: [de-identified]   CSN: [id number]   CEID: [id number]        VTD Group Number:  Insurance Type:    Subscriber Name:  Subscriber :    Subscriber ID:  Pt Rel to Subscriber:    Hospital Account Financial Class: Medicaid Advantage    2021

## (undated) NOTE — ED AVS SNAPSHOT
Ji Tru   MRN: [id number]    Department:  [facility] Emergency Department   Date of Visit:  1/14/2019           Disclosure     Insurance plans vary and the physician(s) referred by the ER may not be covered by your plan.  Please contact CARE PHYSICIAN AT ONCE OR RETURN IMMEDIATELY TO THE EMERGENCY DEPARTMENT. If you have been prescribed any medication(s), please fill your prescription right away and begin taking the medication(s) as directed.   If you believe that any of the medications

## (undated) NOTE — LETTER
8/16/2024      Jose Alberto [address]  [facility] [address]         Estimado/a Jose Alberto,    Le enviamos esta carta para informarle que nuestra oficina oneill intentado ponerse en contacto con usted por teléfono sin éxito. El motivo de la llamada era para informarle sobre  unos detalles de dormir.  Por favor, comuníquese con nuestra oficina llamando al número ubicado debajo para hablar sobre loly manny y para realizar los cambios necesarios a camilo información de contacto en nuestro sistema. Leelee por camilo ayuda.    Sinceramente,    Jarek Collazo, APRN  [address] [facility] [address]  [place] [id number] [phone]        Document electronically generated by:  Mavis AGOSTO MA

## (undated) NOTE — ED AVS SNAPSHOT
Ji Tru   MRN: [id number]    Department:  [facility] Emergency Department   Date of Visit:  1/11/2019           Disclosure     Insurance plans vary and the physician(s) referred by the ER may not be covered by your plan.  Please contact CARE PHYSICIAN AT ONCE OR RETURN IMMEDIATELY TO THE EMERGENCY DEPARTMENT. If you have been prescribed any medication(s), please fill your prescription right away and begin taking the medication(s) as directed.   If you believe that any of the medications

## (undated) NOTE — LETTER
2/22/2021              Nayely Levy        [address]        [facility] [address]         Dear Que Richardson,    This letter is to inform you that our office has made several attempts to reach you by phone without success. We were attempting to contact you by phone regarding your symptoms. Please contact our office at the number listed below as soon as you receive this letter to discuss this issue and to make the necessary changes in our system to your contact information. Thank you for your cooperation. Sincerely,    [address] [place] [address] Drive.  [address] [place] [address]   [phone]

## (undated) NOTE — IP AVS SNAPSHOT
Patient Demographics     Address  [address] [place] [address] [address]  [place] [address] Phone  [phone] [facility])  [phone] (Mobile) *Preferred* E-mail Address  [email or web address]      Emergency Contact(s)     Name Relation Home Work [address] Last 24 Hours      Pregnancy Test, Urine [[id number]] (Normal)  Resulted: 10/04/21 1442, Result status: Final result   Ordering provider: Chata Lugo MD  10/04/21 1343 Resulting lab: [facility] LAB [facility])    Specimen Informati mg/dL [facility])   Creatinine 0.63 0.55 - 1.02 mg/dL — [place] [facility])   BUN/CREA Ratio 17.5 10.0 - 20.0 — [facility])   Calcium, Total 8.1 8.5 - 10.1 mg/dL L [facility])   Calculated Osmolality 295 275 - 295 mOsm/kg — [place] L Order Status: Completed Lab Status: Final result Updated: 10/03/21 0546    Specimen: Other from Nasopharyngeal swab      SARS-CoV-2 (COVID-19) - ([facility]) Not Detected         H&P - H&P Note      H&P filed by Rodo Ohara MD at 10/4/2021  8:00 A diarrhea, headache, blurred vision. Mother is at bedside. PAST MEDICAL HISTORY:  Positive for acne and anemia. PAST SURGICAL HISTORY:  Positive for colonoscopy. MEDICATIONS:   Home medications have been reviewed and reconciled.   Please refer to Suicidal attempt. The patient was intoxicated with alcohol, did have greater than 10 pills of aspirin but the bottle had about 100 and there were only 12 left inside. At this time, the patient's salicylate levels are currently downtrending.   We will con unknown quantity of aspirin. She was also drinking alcohol. Friends and family brought her to the emergency room last night. Upon arrival, her BUN and creatinine were 11 and 0.86 with a CO2 of 27 and a normal anion gap.   Her initial salicylate level was unremarkable. IMPRESSION:  The patient is a 24-year-old female now with:    1. Salicylate overdose and attempted suicide. 2.   Depression. 3.   History of chronic gastrointestinal issues with iron deficiency anemia.     PLAN:  Will hydrate with a bic [place]    10/6/2021 9:30 AM Paula Marroquin MD [facility], [facility], [address] [facility], [facility]    10/13/2021 9:20 AM Kelly Choudhury[address], [address] [place] [address],[address], [facility]    10/14/2021 8:00 AM Zakia Sheridan MD [facility]